# Patient Record
Sex: FEMALE | Race: WHITE | NOT HISPANIC OR LATINO | Employment: OTHER | URBAN - METROPOLITAN AREA
[De-identification: names, ages, dates, MRNs, and addresses within clinical notes are randomized per-mention and may not be internally consistent; named-entity substitution may affect disease eponyms.]

---

## 2023-03-13 DIAGNOSIS — E11.9 DIABETES MELLITUS WITHOUT COMPLICATION (MULTI): ICD-10-CM

## 2023-03-13 PROBLEM — M25.551 RIGHT HIP PAIN: Status: ACTIVE | Noted: 2023-03-13

## 2023-03-13 PROBLEM — D64.9 ANEMIA: Status: ACTIVE | Noted: 2023-03-13

## 2023-03-13 PROBLEM — M54.31 SCIATICA OF RIGHT SIDE: Status: ACTIVE | Noted: 2023-03-13

## 2023-03-13 PROBLEM — E66.812 CLASS 2 SEVERE OBESITY WITH BODY MASS INDEX (BMI) OF 35 TO 39.9 WITH SERIOUS COMORBIDITY: Status: ACTIVE | Noted: 2023-03-13

## 2023-03-13 PROBLEM — E78.5 HLD (HYPERLIPIDEMIA): Status: ACTIVE | Noted: 2023-03-13

## 2023-03-13 PROBLEM — M10.9 ACUTE GOUT: Status: ACTIVE | Noted: 2023-03-13

## 2023-03-13 PROBLEM — N18.4 STAGE 4 CHRONIC KIDNEY DISEASE (MULTI): Status: ACTIVE | Noted: 2023-03-13

## 2023-03-13 PROBLEM — N18.31 STAGE 3A CHRONIC KIDNEY DISEASE (MULTI): Status: ACTIVE | Noted: 2023-03-13

## 2023-03-13 PROBLEM — M70.61 TROCHANTERIC BURSITIS OF RIGHT HIP: Status: ACTIVE | Noted: 2023-03-13

## 2023-03-13 PROBLEM — M47.812 NECK ARTHRITIS: Status: ACTIVE | Noted: 2023-03-13

## 2023-03-13 PROBLEM — M25.561 KNEE PAIN, RIGHT: Status: ACTIVE | Noted: 2023-03-13

## 2023-03-13 PROBLEM — I10 HTN (HYPERTENSION): Status: ACTIVE | Noted: 2023-03-13

## 2023-03-13 PROBLEM — R11.2 NAUSEA AND/OR VOMITING: Status: ACTIVE | Noted: 2023-03-13

## 2023-03-13 PROBLEM — M1A.00X0 CHRONIC IDIOPATHIC GOUT: Status: ACTIVE | Noted: 2023-03-13

## 2023-03-13 PROBLEM — G47.00 INSOMNIA: Status: ACTIVE | Noted: 2023-03-13

## 2023-03-13 PROBLEM — N39.0 UTI (URINARY TRACT INFECTION): Status: ACTIVE | Noted: 2023-03-13

## 2023-03-13 PROBLEM — F41.9 ANXIETY: Status: ACTIVE | Noted: 2023-03-13

## 2023-03-13 PROBLEM — K21.9 CHRONIC GASTROESOPHAGEAL REFLUX DISEASE: Status: ACTIVE | Noted: 2023-03-13

## 2023-03-13 PROBLEM — M15.9 GENERALIZED OSTEOARTHRITIS OF MULTIPLE SITES: Status: ACTIVE | Noted: 2023-03-13

## 2023-03-13 PROBLEM — M54.16 RIGHT LUMBAR RADICULOPATHY: Status: ACTIVE | Noted: 2023-03-13

## 2023-03-13 PROBLEM — R53.1 WEAKNESS: Status: ACTIVE | Noted: 2023-03-13

## 2023-03-13 PROBLEM — E66.01 CLASS 2 SEVERE OBESITY WITH BODY MASS INDEX (BMI) OF 35 TO 39.9 WITH SERIOUS COMORBIDITY (MULTI): Status: ACTIVE | Noted: 2023-03-13

## 2023-03-13 RX ORDER — METOPROLOL SUCCINATE 100 MG/1
1 TABLET, EXTENDED RELEASE ORAL DAILY
COMMUNITY
Start: 2022-06-20 | End: 2023-06-05 | Stop reason: ALTCHOICE

## 2023-03-13 RX ORDER — LOSARTAN POTASSIUM 100 MG/1
1 TABLET ORAL DAILY
COMMUNITY
Start: 2016-05-26 | End: 2024-04-05

## 2023-03-13 RX ORDER — ALLOPURINOL 100 MG/1
TABLET ORAL
COMMUNITY
Start: 2021-05-05 | End: 2024-01-02

## 2023-03-13 RX ORDER — PANTOPRAZOLE SODIUM 40 MG/1
1 TABLET, DELAYED RELEASE ORAL DAILY
COMMUNITY
Start: 2015-01-26 | End: 2023-06-01 | Stop reason: SDUPTHER

## 2023-03-13 RX ORDER — DULAGLUTIDE 1.5 MG/.5ML
INJECTION, SOLUTION SUBCUTANEOUS
COMMUNITY
Start: 2023-01-23 | End: 2023-06-05 | Stop reason: ALTCHOICE

## 2023-03-13 RX ORDER — DAPAGLIFLOZIN 5 MG/1
10 TABLET, FILM COATED ORAL
COMMUNITY
Start: 2023-02-23 | End: 2024-05-29 | Stop reason: SDUPTHER

## 2023-03-13 RX ORDER — INSULIN DETEMIR 100 [IU]/ML
INJECTION, SOLUTION SUBCUTANEOUS
COMMUNITY
Start: 2018-11-13 | End: 2023-03-13 | Stop reason: SDUPTHER

## 2023-03-13 RX ORDER — SIMVASTATIN 20 MG/1
1 TABLET, FILM COATED ORAL DAILY
COMMUNITY
Start: 2015-01-26 | End: 2023-03-20

## 2023-03-13 RX ORDER — CARVEDILOL 12.5 MG/1
1 TABLET ORAL 2 TIMES DAILY
COMMUNITY
Start: 2022-03-07 | End: 2023-06-14 | Stop reason: SDUPTHER

## 2023-03-13 RX ORDER — ERGOCALCIFEROL 1.25 MG/1
1 CAPSULE ORAL
COMMUNITY
Start: 2023-01-25 | End: 2023-10-23

## 2023-03-13 RX ORDER — METFORMIN HYDROCHLORIDE 1000 MG/1
TABLET ORAL
COMMUNITY
Start: 2022-08-17 | End: 2023-06-05 | Stop reason: ALTCHOICE

## 2023-03-13 RX ORDER — AMITRIPTYLINE HYDROCHLORIDE 25 MG/1
TABLET, FILM COATED ORAL
COMMUNITY
Start: 2014-12-24 | End: 2024-02-23 | Stop reason: ALTCHOICE

## 2023-03-13 RX ORDER — PEN NEEDLE, DIABETIC 32GX 5/32"
NEEDLE, DISPOSABLE MISCELLANEOUS
COMMUNITY
Start: 2023-03-06 | End: 2023-08-14 | Stop reason: SDUPTHER

## 2023-03-13 RX ORDER — GLIMEPIRIDE 4 MG/1
1 TABLET ORAL 2 TIMES DAILY
COMMUNITY
Start: 2015-03-07 | End: 2023-06-05 | Stop reason: ALTCHOICE

## 2023-03-13 RX ORDER — BLOOD SUGAR DIAGNOSTIC
STRIP MISCELLANEOUS
COMMUNITY
Start: 2022-07-26

## 2023-03-13 RX ORDER — AMLODIPINE BESYLATE 5 MG/1
1 TABLET ORAL DAILY
COMMUNITY
Start: 2021-05-05 | End: 2023-06-13 | Stop reason: SDUPTHER

## 2023-03-13 RX ORDER — LANCETS 33 GAUGE
EACH MISCELLANEOUS
COMMUNITY
Start: 2023-02-07

## 2023-03-13 RX ORDER — GABAPENTIN 100 MG/1
CAPSULE ORAL
COMMUNITY
Start: 2022-09-14 | End: 2023-06-05 | Stop reason: ALTCHOICE

## 2023-03-13 RX ORDER — CYCLOBENZAPRINE HCL 10 MG
1 TABLET ORAL
COMMUNITY
Start: 2022-09-14 | End: 2023-06-05 | Stop reason: ALTCHOICE

## 2023-03-13 RX ORDER — HYDROCHLOROTHIAZIDE 25 MG/1
1 TABLET ORAL DAILY
COMMUNITY
Start: 2014-10-12 | End: 2024-03-08 | Stop reason: ALTCHOICE

## 2023-03-13 RX ORDER — MEDICAL SUPPLY, MISCELLANEOUS
EACH MISCELLANEOUS
COMMUNITY
Start: 2023-02-07

## 2023-03-14 RX ORDER — INSULIN DETEMIR 100 [IU]/ML
70 INJECTION, SOLUTION SUBCUTANEOUS 2 TIMES DAILY
Qty: 9 EACH | Refills: 1 | Status: SHIPPED | OUTPATIENT
Start: 2023-03-14 | End: 2023-07-13 | Stop reason: SDUPTHER

## 2023-04-26 LAB
ALBUMIN (G/DL) IN SER/PLAS: 4.4 G/DL (ref 3.4–5)
ALBUMIN (MG/L) IN URINE: 13.7 MG/L
ALBUMIN/CREATININE (UG/MG) IN URINE: 18.2 UG/MG CRT (ref 0–30)
ANION GAP IN SER/PLAS: 15 MMOL/L (ref 10–20)
CALCIDIOL (25 OH VITAMIN D3) (NG/ML) IN SER/PLAS: 37 NG/ML
CALCIUM (MG/DL) IN SER/PLAS: 9.5 MG/DL (ref 8.6–10.3)
CARBON DIOXIDE, TOTAL (MMOL/L) IN SER/PLAS: 24 MMOL/L (ref 21–32)
CHLORIDE (MMOL/L) IN SER/PLAS: 100 MMOL/L (ref 98–107)
CREATININE (MG/DL) IN SER/PLAS: 1.8 MG/DL (ref 0.5–1.05)
CREATININE (MG/DL) IN URINE: 75.3 MG/DL (ref 20–320)
ERYTHROCYTE DISTRIBUTION WIDTH (RATIO) BY AUTOMATED COUNT: 14.8 % (ref 11.5–14.5)
ERYTHROCYTE MEAN CORPUSCULAR HEMOGLOBIN CONCENTRATION (G/DL) BY AUTOMATED: 31 G/DL (ref 32–36)
ERYTHROCYTE MEAN CORPUSCULAR VOLUME (FL) BY AUTOMATED COUNT: 97 FL (ref 80–100)
ERYTHROCYTES (10*6/UL) IN BLOOD BY AUTOMATED COUNT: 4.24 X10E12/L (ref 4–5.2)
FERRITIN (UG/LL) IN SER/PLAS: 727 UG/L (ref 8–150)
GFR FEMALE: 28 ML/MIN/1.73M2
GLUCOSE (MG/DL) IN SER/PLAS: 328 MG/DL (ref 74–99)
HEMATOCRIT (%) IN BLOOD BY AUTOMATED COUNT: 41.3 % (ref 36–46)
HEMOGLOBIN (G/DL) IN BLOOD: 12.8 G/DL (ref 12–16)
IRON (UG/DL) IN SER/PLAS: 68 UG/DL (ref 35–150)
IRON BINDING CAPACITY (UG/DL) IN SER/PLAS: 230 UG/DL (ref 240–445)
IRON SATURATION (%) IN SER/PLAS: 30 % (ref 25–45)
LEUKOCYTES (10*3/UL) IN BLOOD BY AUTOMATED COUNT: 9.5 X10E9/L (ref 4.4–11.3)
PHOSPHATE (MG/DL) IN SER/PLAS: 4.1 MG/DL (ref 2.5–4.9)
PLATELETS (10*3/UL) IN BLOOD AUTOMATED COUNT: 245 X10E9/L (ref 150–450)
POTASSIUM (MMOL/L) IN SER/PLAS: 5.3 MMOL/L (ref 3.5–5.3)
SODIUM (MMOL/L) IN SER/PLAS: 134 MMOL/L (ref 136–145)
URATE (MG/DL) IN SER/PLAS: 4.7 MG/DL (ref 2.3–6.7)
UREA NITROGEN (MG/DL) IN SER/PLAS: 32 MG/DL (ref 6–23)

## 2023-04-27 LAB — PARATHYRIN INTACT (PG/ML) IN SER/PLAS: 92.1 PG/ML (ref 18.5–88)

## 2023-05-25 PROBLEM — E55.9 VITAMIN D DEFICIENCY: Status: ACTIVE | Noted: 2023-05-25

## 2023-05-25 PROBLEM — E61.1 IRON DEFICIENCY: Status: ACTIVE | Noted: 2023-05-25

## 2023-06-01 DIAGNOSIS — K21.9 CHRONIC GASTROESOPHAGEAL REFLUX DISEASE: ICD-10-CM

## 2023-06-01 NOTE — TELEPHONE ENCOUNTER
Requested Prescriptions     Pending Prescriptions Disp Refills    pantoprazole (ProtoNix) 40 mg EC tablet 90 tablet 0     Sig: Take 1 tablet (40 mg) by mouth once daily.

## 2023-06-02 RX ORDER — PANTOPRAZOLE SODIUM 40 MG/1
40 TABLET, DELAYED RELEASE ORAL DAILY
Qty: 90 TABLET | Refills: 0 | Status: SHIPPED | OUTPATIENT
Start: 2023-06-02 | End: 2023-08-21

## 2023-06-05 ENCOUNTER — LAB (OUTPATIENT)
Dept: LAB | Facility: LAB | Age: 78
End: 2023-06-05
Payer: MEDICARE

## 2023-06-05 ENCOUNTER — OFFICE VISIT (OUTPATIENT)
Dept: PRIMARY CARE | Facility: CLINIC | Age: 78
End: 2023-06-05
Payer: MEDICARE

## 2023-06-05 VITALS
OXYGEN SATURATION: 98 % | SYSTOLIC BLOOD PRESSURE: 129 MMHG | DIASTOLIC BLOOD PRESSURE: 69 MMHG | WEIGHT: 188 LBS | BODY MASS INDEX: 35.82 KG/M2 | HEART RATE: 77 BPM

## 2023-06-05 DIAGNOSIS — Z79.4 TYPE 2 DIABETES MELLITUS WITH STAGE 4 CHRONIC KIDNEY DISEASE, WITH LONG-TERM CURRENT USE OF INSULIN (MULTI): ICD-10-CM

## 2023-06-05 DIAGNOSIS — E11.22 TYPE 2 DIABETES MELLITUS WITH STAGE 4 CHRONIC KIDNEY DISEASE, WITH LONG-TERM CURRENT USE OF INSULIN (MULTI): ICD-10-CM

## 2023-06-05 DIAGNOSIS — N94.9 VAGINAL BURNING: Primary | ICD-10-CM

## 2023-06-05 DIAGNOSIS — N18.4 TYPE 2 DIABETES MELLITUS WITH STAGE 4 CHRONIC KIDNEY DISEASE, WITH LONG-TERM CURRENT USE OF INSULIN (MULTI): ICD-10-CM

## 2023-06-05 DIAGNOSIS — I10 PRIMARY HYPERTENSION: ICD-10-CM

## 2023-06-05 PROCEDURE — 83036 HEMOGLOBIN GLYCOSYLATED A1C: CPT

## 2023-06-05 PROCEDURE — 99214 OFFICE O/P EST MOD 30 MIN: CPT | Performed by: INTERNAL MEDICINE

## 2023-06-05 PROCEDURE — 1036F TOBACCO NON-USER: CPT | Performed by: INTERNAL MEDICINE

## 2023-06-05 PROCEDURE — 1160F RVW MEDS BY RX/DR IN RCRD: CPT | Performed by: INTERNAL MEDICINE

## 2023-06-05 PROCEDURE — 3074F SYST BP LT 130 MM HG: CPT | Performed by: INTERNAL MEDICINE

## 2023-06-05 PROCEDURE — 3078F DIAST BP <80 MM HG: CPT | Performed by: INTERNAL MEDICINE

## 2023-06-05 PROCEDURE — 36415 COLL VENOUS BLD VENIPUNCTURE: CPT

## 2023-06-05 PROCEDURE — 1159F MED LIST DOCD IN RCRD: CPT | Performed by: INTERNAL MEDICINE

## 2023-06-05 NOTE — PATIENT INSTRUCTIONS
STEFANO Barros Gyn: [phone]    Stop glimepiride and increase levemir to 80 units twice daily , go get A1c checked     Come back in 3 months

## 2023-06-05 NOTE — PROGRESS NOTES
Subjective   Patient ID: Kathya Ruggiero is a 78 y.o. female who presents for Follow-up.    HPI   Reports burning and itching around her vagina. He denies dysuria, urgency or frequency. No vaginal discharge.     Review of Systems   All other systems reviewed and are negative.      Objective   /69   Pulse 77   Wt 85.3 kg (188 lb)   SpO2 98%   BMI 35.82 kg/m²     Physical Exam  Constitutional:       Appearance: Normal appearance.   HENT:      Head: Normocephalic and atraumatic.   Cardiovascular:      Rate and Rhythm: Normal rate and regular rhythm.      Heart sounds: Normal heart sounds. No murmur heard.     No gallop.   Pulmonary:      Effort: Pulmonary effort is normal. No respiratory distress.      Breath sounds: No wheezing or rales.   Skin:     General: Skin is warm and dry.      Findings: No rash.   Neurological:      Mental Status: She is alert and oriented to person, place, and time. Mental status is at baseline.   Psychiatric:         Mood and Affect: Mood normal.         Behavior: Behavior normal.         Assessment/Plan       #IDDM2  -A1C 8.5%. --order today   -Stop glimepiride due to advanced CKD  -Cont Farxiga 5mg daily  -Increase  Levemir 80 Units BID      #HTN  -Well controlled.  Cont losartan 100mg daily and carvedilol 12.5mg BID      #HLD  -Cont statin     #Insomia  -Continue amitriptyline to 50mg nightly      #CKD4  -Following with Dr. Hunter.      #Lumbar radiculopathy, right knee OA  -Cont gabapentin and P.T.      #Gout   -Cont allopurinol         Influenza: UTD   COVID: x3   Prevnar 13: UTD   Pneumovax 23: UTD   Shingrix: x2  Mamm: 6/29/21  DEXA: 6/10/2020  Pap: NI   Colorectal ca: 9/12/13  Lung ca screening: NI      RTC 3 mo

## 2023-06-06 LAB
ESTIMATED AVERAGE GLUCOSE FOR HBA1C: 243 MG/DL
HEMOGLOBIN A1C/HEMOGLOBIN TOTAL IN BLOOD: 10.1 %

## 2023-06-08 DIAGNOSIS — Z79.4 TYPE 2 DIABETES MELLITUS WITH STAGE 4 CHRONIC KIDNEY DISEASE, WITH LONG-TERM CURRENT USE OF INSULIN (MULTI): Primary | ICD-10-CM

## 2023-06-08 DIAGNOSIS — N18.4 TYPE 2 DIABETES MELLITUS WITH STAGE 4 CHRONIC KIDNEY DISEASE, WITH LONG-TERM CURRENT USE OF INSULIN (MULTI): Primary | ICD-10-CM

## 2023-06-08 DIAGNOSIS — E11.22 TYPE 2 DIABETES MELLITUS WITH STAGE 4 CHRONIC KIDNEY DISEASE, WITH LONG-TERM CURRENT USE OF INSULIN (MULTI): Primary | ICD-10-CM

## 2023-06-08 RX ORDER — INSULIN LISPRO 100 [IU]/ML
4 INJECTION, SOLUTION INTRAVENOUS; SUBCUTANEOUS
Qty: 1 EACH | Refills: 0 | Status: SHIPPED | OUTPATIENT
Start: 2023-06-08 | End: 2023-06-09

## 2023-06-09 DIAGNOSIS — E11.22 TYPE 2 DIABETES MELLITUS WITH STAGE 4 CHRONIC KIDNEY DISEASE, WITH LONG-TERM CURRENT USE OF INSULIN (MULTI): ICD-10-CM

## 2023-06-09 DIAGNOSIS — N18.4 TYPE 2 DIABETES MELLITUS WITH STAGE 4 CHRONIC KIDNEY DISEASE, WITH LONG-TERM CURRENT USE OF INSULIN (MULTI): ICD-10-CM

## 2023-06-09 DIAGNOSIS — Z79.4 TYPE 2 DIABETES MELLITUS WITH STAGE 4 CHRONIC KIDNEY DISEASE, WITH LONG-TERM CURRENT USE OF INSULIN (MULTI): ICD-10-CM

## 2023-06-10 RX ORDER — INSULIN ASPART 100 [IU]/ML
INJECTION, SOLUTION INTRAVENOUS; SUBCUTANEOUS
Qty: 10 ML | Refills: 12 | Status: SHIPPED | OUTPATIENT
Start: 2023-06-10 | End: 2023-07-13 | Stop reason: SDUPTHER

## 2023-06-13 DIAGNOSIS — I10 HYPERTENSION, UNSPECIFIED TYPE: ICD-10-CM

## 2023-06-14 RX ORDER — AMLODIPINE BESYLATE 5 MG/1
5 TABLET ORAL DAILY
Qty: 90 TABLET | Refills: 1 | Status: SHIPPED | OUTPATIENT
Start: 2023-06-14 | End: 2024-02-23 | Stop reason: ALTCHOICE

## 2023-06-14 RX ORDER — CARVEDILOL 12.5 MG/1
12.5 TABLET ORAL 2 TIMES DAILY
Qty: 90 TABLET | Refills: 1 | Status: SHIPPED | OUTPATIENT
Start: 2023-06-14 | End: 2023-07-21

## 2023-06-15 ENCOUNTER — OFFICE VISIT (OUTPATIENT)
Dept: PRIMARY CARE | Facility: CLINIC | Age: 78
End: 2023-06-15
Payer: MEDICARE

## 2023-06-15 VITALS
DIASTOLIC BLOOD PRESSURE: 70 MMHG | TEMPERATURE: 98.9 F | SYSTOLIC BLOOD PRESSURE: 126 MMHG | WEIGHT: 189 LBS | BODY MASS INDEX: 36.01 KG/M2 | HEART RATE: 91 BPM

## 2023-06-15 DIAGNOSIS — H66.90 ACUTE OTITIS MEDIA, UNSPECIFIED OTITIS MEDIA TYPE: Primary | ICD-10-CM

## 2023-06-15 PROCEDURE — 1036F TOBACCO NON-USER: CPT | Performed by: INTERNAL MEDICINE

## 2023-06-15 PROCEDURE — 1160F RVW MEDS BY RX/DR IN RCRD: CPT | Performed by: INTERNAL MEDICINE

## 2023-06-15 PROCEDURE — 99213 OFFICE O/P EST LOW 20 MIN: CPT | Performed by: INTERNAL MEDICINE

## 2023-06-15 PROCEDURE — 1159F MED LIST DOCD IN RCRD: CPT | Performed by: INTERNAL MEDICINE

## 2023-06-15 PROCEDURE — 3074F SYST BP LT 130 MM HG: CPT | Performed by: INTERNAL MEDICINE

## 2023-06-15 PROCEDURE — 3078F DIAST BP <80 MM HG: CPT | Performed by: INTERNAL MEDICINE

## 2023-06-15 RX ORDER — AMOXICILLIN 875 MG/1
875 TABLET, FILM COATED ORAL 2 TIMES DAILY
Qty: 14 TABLET | Refills: 0 | Status: SHIPPED | OUTPATIENT
Start: 2023-06-15 | End: 2023-06-22

## 2023-06-15 ASSESSMENT — PATIENT HEALTH QUESTIONNAIRE - PHQ9
SUM OF ALL RESPONSES TO PHQ9 QUESTIONS 1 AND 2: 0
2. FEELING DOWN, DEPRESSED OR HOPELESS: NOT AT ALL
1. LITTLE INTEREST OR PLEASURE IN DOING THINGS: NOT AT ALL

## 2023-06-15 ASSESSMENT — PAIN SCALES - GENERAL: PAINLEVEL: 10-WORST PAIN EVER

## 2023-06-15 ASSESSMENT — ENCOUNTER SYMPTOMS
DEPRESSION: 0
LOSS OF SENSATION IN FEET: 0
OCCASIONAL FEELINGS OF UNSTEADINESS: 0

## 2023-06-15 NOTE — PROGRESS NOTES
Subjective   Patient ID: Kathya Ruggiero is a 78 y.o. female who presents for Earache (Left ear).    Earache          Left ear pain x 2 days  No drainage   No new new hearing   Has chronic tinnitus   No sore throat, cough, fever or chills    Review of Systems   HENT:  Positive for ear pain.    All other systems reviewed and are negative.      Objective   /70 (BP Location: Left arm, Patient Position: Sitting, BP Cuff Size: Adult)   Pulse 91   Temp 37.2 °C (98.9 °F) (Oral)   Wt 85.7 kg (189 lb)   BMI 36.01 kg/m²     Physical Exam  Constitutional:       Appearance: Normal appearance.   HENT:      Head: Normocephalic and atraumatic.      Right Ear: Tympanic membrane, ear canal and external ear normal.      Left Ear: Ear canal and external ear normal.      Ears:      Comments: Left TM erythematous   Cardiovascular:      Rate and Rhythm: Normal rate and regular rhythm.      Heart sounds: Normal heart sounds. No murmur heard.     No gallop.   Pulmonary:      Effort: Pulmonary effort is normal. No respiratory distress.      Breath sounds: No wheezing or rales.   Skin:     General: Skin is warm and dry.      Findings: No rash.   Neurological:      Mental Status: She is alert and oriented to person, place, and time. Mental status is at baseline.   Psychiatric:         Mood and Affect: Mood normal.         Behavior: Behavior normal.         Assessment/Plan          
No

## 2023-06-15 NOTE — PATIENT INSTRUCTIONS
Increase levemir to 80 units twice daily   Premeal prior to breakfast and dinner -- 6 units    Take amoxicillin

## 2023-07-05 ENCOUNTER — APPOINTMENT (OUTPATIENT)
Dept: PRIMARY CARE | Facility: CLINIC | Age: 78
End: 2023-07-05
Payer: MEDICARE

## 2023-07-13 ENCOUNTER — OFFICE VISIT (OUTPATIENT)
Dept: PRIMARY CARE | Facility: CLINIC | Age: 78
End: 2023-07-13
Payer: MEDICARE

## 2023-07-13 VITALS
SYSTOLIC BLOOD PRESSURE: 117 MMHG | HEART RATE: 60 BPM | WEIGHT: 187 LBS | HEIGHT: 61 IN | BODY MASS INDEX: 35.3 KG/M2 | DIASTOLIC BLOOD PRESSURE: 64 MMHG

## 2023-07-13 DIAGNOSIS — Z79.4 TYPE 2 DIABETES MELLITUS WITH STAGE 4 CHRONIC KIDNEY DISEASE, WITH LONG-TERM CURRENT USE OF INSULIN (MULTI): ICD-10-CM

## 2023-07-13 DIAGNOSIS — N18.4 TYPE 2 DIABETES MELLITUS WITH STAGE 4 CHRONIC KIDNEY DISEASE, WITH LONG-TERM CURRENT USE OF INSULIN (MULTI): ICD-10-CM

## 2023-07-13 DIAGNOSIS — E11.22 TYPE 2 DIABETES MELLITUS WITH STAGE 4 CHRONIC KIDNEY DISEASE, WITH LONG-TERM CURRENT USE OF INSULIN (MULTI): ICD-10-CM

## 2023-07-13 DIAGNOSIS — E11.9 DIABETES MELLITUS WITHOUT COMPLICATION (MULTI): ICD-10-CM

## 2023-07-13 PROCEDURE — 1036F TOBACCO NON-USER: CPT | Performed by: INTERNAL MEDICINE

## 2023-07-13 PROCEDURE — 99213 OFFICE O/P EST LOW 20 MIN: CPT | Performed by: INTERNAL MEDICINE

## 2023-07-13 PROCEDURE — 1159F MED LIST DOCD IN RCRD: CPT | Performed by: INTERNAL MEDICINE

## 2023-07-13 PROCEDURE — 1126F AMNT PAIN NOTED NONE PRSNT: CPT | Performed by: INTERNAL MEDICINE

## 2023-07-13 PROCEDURE — 1160F RVW MEDS BY RX/DR IN RCRD: CPT | Performed by: INTERNAL MEDICINE

## 2023-07-13 PROCEDURE — 3074F SYST BP LT 130 MM HG: CPT | Performed by: INTERNAL MEDICINE

## 2023-07-13 PROCEDURE — 3078F DIAST BP <80 MM HG: CPT | Performed by: INTERNAL MEDICINE

## 2023-07-13 RX ORDER — INSULIN DETEMIR 100 [IU]/ML
80 INJECTION, SOLUTION SUBCUTANEOUS 2 TIMES DAILY
Qty: 144 ML | Refills: 1 | Status: SHIPPED | OUTPATIENT
Start: 2023-07-13 | End: 2024-02-23 | Stop reason: ALTCHOICE

## 2023-07-13 RX ORDER — INSULIN ASPART 100 [IU]/ML
INJECTION, SOLUTION INTRAVENOUS; SUBCUTANEOUS
Qty: 18 ML | Refills: 12 | Status: SHIPPED | OUTPATIENT
Start: 2023-07-13 | End: 2024-03-08 | Stop reason: SDUPTHER

## 2023-07-13 ASSESSMENT — PAIN SCALES - GENERAL: PAINLEVEL: 0-NO PAIN

## 2023-07-13 NOTE — PATIENT INSTRUCTIONS
CHECK GLUCOSE IN AM ,BEDTIME AND 2 HOURS AFTER LARGEST MEAL OF THE DAY     GOAL AM SUGAR <150  POST MEAL  <180    COME BACK IN 3 MONTHS , GET A1C BEFORE YOU COME BACK

## 2023-07-13 NOTE — PROGRESS NOTES
"Subjective   Patient ID: Kathya Ruggiero is a 78 y.o. female who presents for Follow-up.    HPI   Sugars much improved. AM randing low 100s-mid 100s. No lows   She increased her Lispro to 10 units AC--usually only eats 2 meals daily     Review of Systems   All other systems reviewed and are negative.      Objective   /64 (BP Location: Left arm, Patient Position: Sitting, BP Cuff Size: Large adult)   Pulse 60   Ht 1.543 m (5' 0.75\")   Wt 84.8 kg (187 lb)   BMI 35.62 kg/m²     Physical Exam  Constitutional:       Appearance: Normal appearance.   HENT:      Head: Normocephalic and atraumatic.   Cardiovascular:      Rate and Rhythm: Normal rate and regular rhythm.      Heart sounds: Normal heart sounds. No murmur heard.     No gallop.   Pulmonary:      Effort: Pulmonary effort is normal. No respiratory distress.      Breath sounds: No wheezing or rales.   Skin:     General: Skin is warm and dry.      Findings: No rash.   Neurological:      Mental Status: She is alert and oriented to person, place, and time. Mental status is at baseline.   Psychiatric:         Mood and Affect: Mood normal.         Behavior: Behavior normal.         Assessment/Plan       #DM2  -Sugars much improved.  -Cont Levemir 80 units BID and Novolog 10U AC   -Cont Farxiga 5mg daily     RTC 3 mo with A1c prior        "

## 2023-07-20 DIAGNOSIS — I10 HYPERTENSION, UNSPECIFIED TYPE: ICD-10-CM

## 2023-07-21 RX ORDER — CARVEDILOL 12.5 MG/1
12.5 TABLET ORAL 2 TIMES DAILY
Qty: 180 TABLET | Refills: 0 | Status: SHIPPED | OUTPATIENT
Start: 2023-07-21 | End: 2023-12-08

## 2023-07-28 DIAGNOSIS — G47.00 INSOMNIA, UNSPECIFIED TYPE: Primary | ICD-10-CM

## 2023-07-31 RX ORDER — AMITRIPTYLINE HYDROCHLORIDE 50 MG/1
50 TABLET, FILM COATED ORAL NIGHTLY
Qty: 90 TABLET | Refills: 1 | Status: SHIPPED | OUTPATIENT
Start: 2023-07-31 | End: 2024-01-29 | Stop reason: SDUPTHER

## 2023-08-09 RX ORDER — PEN NEEDLE, DIABETIC 32GX 5/32"
NEEDLE, DISPOSABLE MISCELLANEOUS
Status: CANCELLED | OUTPATIENT
Start: 2023-08-09

## 2023-08-14 DIAGNOSIS — Z79.4 TYPE 2 DIABETES MELLITUS WITH STAGE 4 CHRONIC KIDNEY DISEASE, WITH LONG-TERM CURRENT USE OF INSULIN (MULTI): Primary | ICD-10-CM

## 2023-08-14 DIAGNOSIS — E11.22 TYPE 2 DIABETES MELLITUS WITH STAGE 4 CHRONIC KIDNEY DISEASE, WITH LONG-TERM CURRENT USE OF INSULIN (MULTI): Primary | ICD-10-CM

## 2023-08-14 DIAGNOSIS — N18.4 TYPE 2 DIABETES MELLITUS WITH STAGE 4 CHRONIC KIDNEY DISEASE, WITH LONG-TERM CURRENT USE OF INSULIN (MULTI): Primary | ICD-10-CM

## 2023-08-14 RX ORDER — PEN NEEDLE, DIABETIC 32GX 5/32"
2 NEEDLE, DISPOSABLE MISCELLANEOUS 2 TIMES DAILY
Qty: 200 EACH | Refills: 1 | Status: SHIPPED | OUTPATIENT
Start: 2023-08-14 | End: 2023-11-20

## 2023-08-14 NOTE — TELEPHONE ENCOUNTER
"Requested Prescriptions     Pending Prescriptions Disp Refills    pen needle, diabetic 31 gauge x 15/64\" needle 200 each 1     Si each by abdominal subcutaneous route 2 times a day.       "

## 2023-08-21 DIAGNOSIS — K21.9 CHRONIC GASTROESOPHAGEAL REFLUX DISEASE: ICD-10-CM

## 2023-08-21 RX ORDER — PANTOPRAZOLE SODIUM 40 MG/1
40 TABLET, DELAYED RELEASE ORAL DAILY
Qty: 90 TABLET | Refills: 2 | Status: SHIPPED | OUTPATIENT
Start: 2023-08-21

## 2023-08-21 NOTE — TELEPHONE ENCOUNTER
Requested Prescriptions     Pending Prescriptions Disp Refills    pantoprazole (ProtoNix) 40 mg EC tablet [Pharmacy Med Name: Pantoprazole Sodium 40 MG Oral Tablet Delayed Release] 90 tablet 2     Sig: Take 1 tablet by mouth once daily

## 2023-09-06 ENCOUNTER — APPOINTMENT (OUTPATIENT)
Dept: PRIMARY CARE | Facility: CLINIC | Age: 78
End: 2023-09-06
Payer: MEDICARE

## 2023-10-11 ENCOUNTER — LAB (OUTPATIENT)
Dept: LAB | Facility: LAB | Age: 78
End: 2023-10-11
Payer: MEDICARE

## 2023-10-11 DIAGNOSIS — E11.22 TYPE 2 DIABETES MELLITUS WITH STAGE 4 CHRONIC KIDNEY DISEASE, WITH LONG-TERM CURRENT USE OF INSULIN (MULTI): ICD-10-CM

## 2023-10-11 DIAGNOSIS — Z79.4 TYPE 2 DIABETES MELLITUS WITH STAGE 4 CHRONIC KIDNEY DISEASE, WITH LONG-TERM CURRENT USE OF INSULIN (MULTI): ICD-10-CM

## 2023-10-11 DIAGNOSIS — N18.4 TYPE 2 DIABETES MELLITUS WITH STAGE 4 CHRONIC KIDNEY DISEASE, WITH LONG-TERM CURRENT USE OF INSULIN (MULTI): ICD-10-CM

## 2023-10-11 PROCEDURE — 36415 COLL VENOUS BLD VENIPUNCTURE: CPT

## 2023-10-11 PROCEDURE — 83036 HEMOGLOBIN GLYCOSYLATED A1C: CPT

## 2023-10-12 LAB
EST. AVERAGE GLUCOSE BLD GHB EST-MCNC: 194 MG/DL
HBA1C MFR BLD: 8.4 %

## 2023-10-16 ENCOUNTER — OFFICE VISIT (OUTPATIENT)
Dept: PRIMARY CARE | Facility: CLINIC | Age: 78
End: 2023-10-16
Payer: MEDICARE

## 2023-10-16 VITALS
SYSTOLIC BLOOD PRESSURE: 149 MMHG | OXYGEN SATURATION: 92 % | DIASTOLIC BLOOD PRESSURE: 73 MMHG | BODY MASS INDEX: 35.43 KG/M2 | WEIGHT: 186 LBS | HEART RATE: 69 BPM

## 2023-10-16 DIAGNOSIS — Z23 NEED FOR INFLUENZA VACCINATION: ICD-10-CM

## 2023-10-16 DIAGNOSIS — E11.9 DIABETES MELLITUS WITHOUT COMPLICATION (MULTI): Primary | ICD-10-CM

## 2023-10-16 PROCEDURE — 1036F TOBACCO NON-USER: CPT | Performed by: INTERNAL MEDICINE

## 2023-10-16 PROCEDURE — G0008 ADMIN INFLUENZA VIRUS VAC: HCPCS | Performed by: INTERNAL MEDICINE

## 2023-10-16 PROCEDURE — 1126F AMNT PAIN NOTED NONE PRSNT: CPT | Performed by: INTERNAL MEDICINE

## 2023-10-16 PROCEDURE — 1160F RVW MEDS BY RX/DR IN RCRD: CPT | Performed by: INTERNAL MEDICINE

## 2023-10-16 PROCEDURE — 99213 OFFICE O/P EST LOW 20 MIN: CPT | Performed by: INTERNAL MEDICINE

## 2023-10-16 PROCEDURE — 3078F DIAST BP <80 MM HG: CPT | Performed by: INTERNAL MEDICINE

## 2023-10-16 PROCEDURE — 3077F SYST BP >= 140 MM HG: CPT | Performed by: INTERNAL MEDICINE

## 2023-10-16 PROCEDURE — 1159F MED LIST DOCD IN RCRD: CPT | Performed by: INTERNAL MEDICINE

## 2023-10-16 PROCEDURE — 90662 IIV NO PRSV INCREASED AG IM: CPT | Performed by: INTERNAL MEDICINE

## 2023-10-16 RX ORDER — FLASH GLUCOSE SENSOR
KIT MISCELLANEOUS
Qty: 1 EACH | Refills: 0 | Status: SHIPPED | OUTPATIENT
Start: 2023-10-16 | End: 2024-01-22 | Stop reason: SDUPTHER

## 2023-10-16 RX ORDER — FLASH GLUCOSE SCANNING READER
EACH MISCELLANEOUS
Qty: 1 EACH | Refills: 0 | Status: SHIPPED | OUTPATIENT
Start: 2023-10-16

## 2023-10-16 NOTE — PROGRESS NOTES
fluzSubjective   Patient ID: Kathya Ruggiero is a 78 y.o. female who presents for Follow-up (3 month follow up).    HPI     Review of Systems    Objective   There were no vitals taken for this visit.    Physical Exam    Assessment/Plan

## 2023-10-16 NOTE — PROGRESS NOTES
Subjective   Patient ID: Kathya Ruggiero is a 78 y.o. female who presents for Follow-up (3 month follow up).    HPI   Patient is doing well.  Home sugars higher in October, states she eats sweets when she gets nervous and has been anxious this month due to family issues. Would like arm glucose monitor.       Objective   /73   Pulse 69   Wt 84.4 kg (186 lb)   SpO2 92%   BMI 35.43 kg/m²     Physical Exam  Constitutional:       General: She is not in acute distress.     Appearance: Normal appearance.   Cardiovascular:      Rate and Rhythm: Normal rate and regular rhythm.   Pulmonary:      Effort: Pulmonary effort is normal.      Breath sounds: Normal breath sounds.   Neurological:      Mental Status: She is alert and oriented to person, place, and time.   Psychiatric:         Mood and Affect: Mood normal.         Behavior: Behavior normal.         Assessment/Plan     #DM2  -A1C 8.4%   -Cont Farxiga 5mg daily  -Cont Levemir 80 Units BID   -Increase Novolog from 15U AC to 20U AC  -Free Style glucose monitor ordered     #HTN  -High here, not checking BP at home. . Cont losartan 100mg daily and carvedilol 12.5mg BID   -Instructed to monitor BP at home and call me if >140/90     #HLD  -Cont simvastatin 20mg     #Insomia  -Continue amitriptyline 50mg nightly      #CKD4  -Following with Dr. Hunter. Labs will be ordered at next month's appt.     #Gout   -Cont allopurinol 100mg        Influenza: 10/16/23   COVID: x3   Prevnar 13: UTD   Pneumovax 23: UTD   Shingrix: x2  Mamm: 6/29/21  DEXA: 6/10/2020  Pap: NI   Colorectal ca: 9/12/13  Lung ca screening: NI      Labs discussed with patient.   RTC 3 mo     Medical student note. Physician co-sign required.

## 2023-10-16 NOTE — PATIENT INSTRUCTIONS
Increase premeal insulin to 20 units, cont other meds as is   Freestyle ordered  Check bp at home , goal <140/90  Come back in 3 months

## 2023-10-23 DIAGNOSIS — E55.9 VITAMIN D DEFICIENCY: Primary | ICD-10-CM

## 2023-10-23 RX ORDER — ERGOCALCIFEROL 1.25 MG/1
1 CAPSULE ORAL
Qty: 13 CAPSULE | Refills: 0 | Status: SHIPPED | OUTPATIENT
Start: 2023-10-23 | End: 2024-01-16

## 2023-11-08 ENCOUNTER — APPOINTMENT (OUTPATIENT)
Dept: NEPHROLOGY | Facility: CLINIC | Age: 78
End: 2023-11-08
Payer: MEDICARE

## 2023-11-17 ENCOUNTER — LAB (OUTPATIENT)
Dept: LAB | Facility: LAB | Age: 78
End: 2023-11-17
Payer: MEDICARE

## 2023-11-17 DIAGNOSIS — N18.4 CHRONIC KIDNEY DISEASE, STAGE 4 (SEVERE) (MULTI): ICD-10-CM

## 2023-11-17 DIAGNOSIS — E55.9 VITAMIN D DEFICIENCY, UNSPECIFIED: ICD-10-CM

## 2023-11-17 DIAGNOSIS — E11.9 TYPE 2 DIABETES MELLITUS WITHOUT COMPLICATIONS (MULTI): ICD-10-CM

## 2023-11-17 DIAGNOSIS — I12.9 HYPERTENSIVE CHRONIC KIDNEY DISEASE WITH STAGE 1 THROUGH STAGE 4 CHRONIC KIDNEY DISEASE, OR UNSPECIFIED CHRONIC KIDNEY DISEASE: ICD-10-CM

## 2023-11-17 DIAGNOSIS — D64.9 ANEMIA, UNSPECIFIED: Primary | ICD-10-CM

## 2023-11-17 LAB
ALBUMIN SERPL BCP-MCNC: 4.6 G/DL (ref 3.4–5)
ANION GAP SERPL CALC-SCNC: 16 MMOL/L (ref 10–20)
BUN SERPL-MCNC: 31 MG/DL (ref 6–23)
CALCIUM SERPL-MCNC: 9.2 MG/DL (ref 8.6–10.3)
CHLORIDE SERPL-SCNC: 101 MMOL/L (ref 98–107)
CO2 SERPL-SCNC: 23 MMOL/L (ref 21–32)
CREAT SERPL-MCNC: 1.71 MG/DL (ref 0.5–1.05)
GFR SERPL CREATININE-BSD FRML MDRD: 30 ML/MIN/1.73M*2
GLUCOSE SERPL-MCNC: 187 MG/DL (ref 74–99)
PHOSPHATE SERPL-MCNC: 4.6 MG/DL (ref 2.5–4.9)
POTASSIUM SERPL-SCNC: 5.3 MMOL/L (ref 3.5–5.3)
SODIUM SERPL-SCNC: 135 MMOL/L (ref 136–145)
URATE SERPL-MCNC: 4.5 MG/DL (ref 2.3–6.7)

## 2023-11-17 PROCEDURE — 80069 RENAL FUNCTION PANEL: CPT

## 2023-11-17 PROCEDURE — 83970 ASSAY OF PARATHORMONE: CPT

## 2023-11-17 PROCEDURE — 36415 COLL VENOUS BLD VENIPUNCTURE: CPT

## 2023-11-17 PROCEDURE — 82306 VITAMIN D 25 HYDROXY: CPT

## 2023-11-17 PROCEDURE — 84550 ASSAY OF BLOOD/URIC ACID: CPT

## 2023-11-18 DIAGNOSIS — N18.4 TYPE 2 DIABETES MELLITUS WITH STAGE 4 CHRONIC KIDNEY DISEASE, WITH LONG-TERM CURRENT USE OF INSULIN (MULTI): ICD-10-CM

## 2023-11-18 DIAGNOSIS — E11.22 TYPE 2 DIABETES MELLITUS WITH STAGE 4 CHRONIC KIDNEY DISEASE, WITH LONG-TERM CURRENT USE OF INSULIN (MULTI): ICD-10-CM

## 2023-11-18 DIAGNOSIS — Z79.4 TYPE 2 DIABETES MELLITUS WITH STAGE 4 CHRONIC KIDNEY DISEASE, WITH LONG-TERM CURRENT USE OF INSULIN (MULTI): ICD-10-CM

## 2023-11-18 LAB
25(OH)D3 SERPL-MCNC: 73 NG/ML (ref 30–100)
PTH-INTACT SERPL-MCNC: 89.1 PG/ML (ref 18.5–88)

## 2023-11-20 RX ORDER — PEN NEEDLE, DIABETIC 32GX 5/32"
NEEDLE, DISPOSABLE MISCELLANEOUS
Qty: 200 EACH | Refills: 1 | Status: SHIPPED | OUTPATIENT
Start: 2023-11-20 | End: 2024-04-22

## 2023-11-20 NOTE — TELEPHONE ENCOUNTER
"Requested Prescriptions     Pending Prescriptions Disp Refills    pen needle, diabetic 31 gauge x 15/64\" needle [Pharmacy Med Name: RELION PEN NEEDLES [id number] MIS] 200 each 1     Sig: USE 2  TWICE DAILY       "

## 2023-11-21 ENCOUNTER — OFFICE VISIT (OUTPATIENT)
Dept: NEPHROLOGY | Facility: CLINIC | Age: 78
End: 2023-11-21
Payer: MEDICARE

## 2023-11-21 VITALS
HEIGHT: 60 IN | RESPIRATION RATE: 18 BRPM | DIASTOLIC BLOOD PRESSURE: 69 MMHG | OXYGEN SATURATION: 98 % | HEART RATE: 57 BPM | TEMPERATURE: 96.9 F | WEIGHT: 186.6 LBS | SYSTOLIC BLOOD PRESSURE: 162 MMHG | BODY MASS INDEX: 36.63 KG/M2

## 2023-11-21 DIAGNOSIS — E55.9 VITAMIN D DEFICIENCY: ICD-10-CM

## 2023-11-21 DIAGNOSIS — N18.32 STAGE 3B CHRONIC KIDNEY DISEASE (MULTI): Primary | ICD-10-CM

## 2023-11-21 DIAGNOSIS — E66.01 CLASS 2 SEVERE OBESITY WITH BODY MASS INDEX (BMI) OF 35 TO 39.9 WITH SERIOUS COMORBIDITY (MULTI): ICD-10-CM

## 2023-11-21 DIAGNOSIS — I10 PRIMARY HYPERTENSION: ICD-10-CM

## 2023-11-21 DIAGNOSIS — D50.8 OTHER IRON DEFICIENCY ANEMIA: ICD-10-CM

## 2023-11-21 DIAGNOSIS — M10.00 ACUTE IDIOPATHIC GOUT, UNSPECIFIED SITE: ICD-10-CM

## 2023-11-21 PROCEDURE — 1126F AMNT PAIN NOTED NONE PRSNT: CPT | Performed by: INTERNAL MEDICINE

## 2023-11-21 PROCEDURE — 3078F DIAST BP <80 MM HG: CPT | Performed by: INTERNAL MEDICINE

## 2023-11-21 PROCEDURE — 1160F RVW MEDS BY RX/DR IN RCRD: CPT | Performed by: INTERNAL MEDICINE

## 2023-11-21 PROCEDURE — 1036F TOBACCO NON-USER: CPT | Performed by: INTERNAL MEDICINE

## 2023-11-21 PROCEDURE — 3077F SYST BP >= 140 MM HG: CPT | Performed by: INTERNAL MEDICINE

## 2023-11-21 PROCEDURE — 1159F MED LIST DOCD IN RCRD: CPT | Performed by: INTERNAL MEDICINE

## 2023-11-21 PROCEDURE — 99214 OFFICE O/P EST MOD 30 MIN: CPT | Performed by: INTERNAL MEDICINE

## 2023-11-21 NOTE — PATIENT INSTRUCTIONS
Dear JUAN   It was nice seeing you in the nephrology clinic today     Today we discussed the following:     #Chronic kidney disease stage 3/4 most likely diabetes and hypertension effect. Your kidney function is now stable about 25-30% (this is new baseline now). Continue to hold water pills. I want you to continue losartan and Keep yourself well-hydrated     #Uric acid is elevated-now normal. Continue allopurinol     #Hypertension-blood pressure is accepted at home-continue losartan 100 mg. Do not take hydrochlorothiazide/water pill        #Diabetes-uncontrolled-continue to hold metformin. Continue Farxiga 10 mg- this class of medication might increase risk of urinary tract infection. Keep the genital area clean and dry. Don't hold urine (counseled)     #Anemia with low iron-improved significantly after IV iron infusion     #Low vitamin D-now normal. Continue vitamin D supplements weekly        Follow-up in 6 months with repeat blood work and urinalysis prior to next visit.     Please call our office if you have any question  Thank you for coming to see me today     Erasmo Hunter MD, MS, JELLY ALVARADO  Clinical  - [employer]  Nephrologist - [facility] - [facility]

## 2023-11-21 NOTE — PROGRESS NOTES
Subjective     Ms. Ruggiero is a 78 year- old female with past medical history of iron deficiency anemia, recurrent gout, GERD on PPI, type 2 diabetes, hyperlipidemia, hypertension and CKD stage 4 was coming to see me today for follow-up for worsening chronic kidney disease    Last office visit May 2023.  No events in the interim.  Kathya came alone today.  No kidney related complaints or concerns.  She is adherent with medication and low-salt diet.  She started using continuous glucose monitor which is helping her to take care of her diabetes.  Blood pressure at home is reported to be 130-140/70.  Blood pressure elevated in office today-asymptomatic.  She had blood work done on 17 November-all results were discussed with her in details including stable serum creatinine 1.7 and GFR 30.  With normal lecture lites.  Within normal vitamin D and uric acid.  No significant anemia.  She is excited to know that she is doing better from the kidney standpoint      Prior notes  Last office visit January 2023. In interim no events. She came alone today. She reports being in her baseline state of health. Blood pressure is accepted. Restarted Farxiga last office visit-blood sugar has been running 130-140. She had blood work done recently and results were discussed with her in details including stable serum creatinine 1.8 and GFR 28 with a normal electrolytes. She underwent a course of IV iron infusion-significantly improved anemia 12.8 and replete iron storage. Last office visit we started weekly vitamin D for vitamin D deficiency-Vitamin D is now normal. Uric acid was elevated-now normal after starting allopurinol. Overall she feels better     Her prior notes     Last office visit November 2022. Due to concerns regarding volume depletion and low blood pressure causing worsening kidney function we held her allopurinol and chlorthalidone. We also instructed her to hold losartan 100 mg which she did not. In the interim, she did not  have any weight gain or worsening leg swelling. She reports pain in the big toe. She checks blood pressure at home and average reading 120-130/50-60. She had blood work done prior to this visit and all results were discussed with her in details today including slightly improving serum creatinine 1.1 from 2.2 and GFR improved 26 from 22. Uric acid is worsening after holding allopurinol 8.5. She has anemia with hemoglobin 10.9 and iron deficiency. She had SPEP was negative. No albuminuria was bland urine. Vitamin D is low. We discussed all these results with her and plan as below        She is visiting us for CKD 3 and she has denied any symptoms like leg swelling, SOB after stopping HCTZ (as per our instructions last office visit due to worsening kidney function and concerns about volume depletion). She is on low salt diet and she has blood work done on this visit . She is anemic-discussed with her starting iron infusion as opposed to p.o. pills-agreed. He continues to be on losartan 100 mg daily  Her uric acid also elevated and she complained of great toe pain on and off will resume allopurinol and monitor . Her vitamin D is also low and will start on Weekly supplements and patient agreed for the plan. Her electrolytes has been normal and will give follow up in 6 months . she has been improving in her kidney function trying to reach baselin.  Her weight is stable after stopping HCTZ. No protein leak. Will start on SGLT-2 inhibitors.              Per prior notes  Last office visit September 2022. We discussed worsening kidney function. We encouraged appropriate hydration. We stopped metformin for lower GFR. In interim, she feels okay. Her blood pressure has been running high recently. She is on insulin. She denies increased urine output or dehydration. She keeps herself well-hydrated. No NSAID use at home. She started watching her salt. She had blood work done few days ago and all results were discussed with her in  details including worsening serum creatinine 2.2 and worsening GFR 22. Within normal electrolytes. Borderline low sodium reflecting increased fluid intake. Urine analysis is bland with no blood or protein. Uric acid is normal 5.6 on allopurinol 200 mg. She does check blood pressure at home and readings are fluctuating but mostly running on the lower side. I discussed the results with her. We agreed we will follow conservative measures.   Prior notes     Kathya came alone today. She is aware of history of chronic kidney disease stage III. Recently, she reports decreased p.o. fluid intake and fresh greens in her diet. She thinks she might have been dehydrated. She uses NSAIDs occasionally for acute gout flare and joint pain. She reports low blood sugar recently and metformin was held. She noticed her blood sugar has been rising and she resumed taking metformin 1000 mg twice daily. She checks blood pressure at home and average reading 130 over 60s. No lower urinary tract symptoms. No recent contrast exposure. She had blood work done in July 2022 that showed worsening GFR from her baseline 35 down to 28. She had kidney ultrasound done that did not show hydronephrosis or postrenal obstruction.     Social history: Non-smoker, , no alcohol  Family history: Relevant       Objective   /69 (Patient Position: Standing)   Pulse 57   Temp 36.1 °C (96.9 °F)   Resp 18   Ht 1.524 m (5')   Wt 84.6 kg (186 lb 9.6 oz)   SpO2 98%   BMI 36.44 kg/m²   Wt Readings from Last 3 Encounters:   11/21/23 84.6 kg (186 lb 9.6 oz)   10/16/23 84.4 kg (186 lb)   07/13/23 84.8 kg (187 lb)       Physical Exam    General appearance: no distress awake and alert on room air, euvolemic on exam  Eyes: non-icteric  HEENT: atrumatic head, PEERLA, moist mucosa  Skin: no apparent rash  Heart: NSR, S1, S2 normal, no murmur or gallop  Lungs: Symmetrical expansion,CTA bilat no wheezing/crackles  Abdomen: soft, nt/nd, obese  Extremities: no  "edema bilat  Neuro: No FND,asterixis, no focal deficits noticed        Review of Systems     Constitutional: no fever, no chills, no recent weight gain and no recent weight loss.   Eyes: no blurred vision and no diplopia.   ENT: no hearing loss, no earache, no sore throat, no swollen glands in the neck and no nasal discharge.   Cardiovascular: no chest pain, no palpitations and no lower extremity edema.   Respiratory: no shortness of breath, no chronic cough and no shortness of breath during exertion.   Gastrointestinal: no abdominal pain, no constipation, no heartburn, no vomiting, no bloody stools and no change in bowel movements.   Genitourinary: no dysuria and no hematuria.   Musculoskeletal: no arthralgias and no myalgias.   Skin: no rashes and no skin lesions.   Neurological: no headaches and no dizziness.   Psychiatric: no confusion, no depression and no anxiety.   Endocrine: no heat intolerance, no cold intolerance, appetite not increased, no thyroid disorder, no increased urinary frequency and no dry skin.   Hematologic/Lymphatic: does not bleed easily and does not bruise easily.   All other systems have been reviewed and are negative for complaint.         Data Review               Results from last 7 days   Lab Units 11/17/23  1451   SODIUM mmol/L 135*   POTASSIUM mmol/L 5.3   CHLORIDE mmol/L 101   CO2 mmol/L 23   BUN mg/dL 31*   CREATININE mg/dL 1.71*   EGFR mL/min/1.73m*2 30*   GLUCOSE mg/dL 187*   CALCIUM mg/dL 9.2   PHOSPHORUS mg/dL 4.6       Lab Results   Component Value Date    URICACID 4.5 11/17/2023       No components found for: \"[id number]\"      Lab Results   Component Value Date    HGBA1C 8.4 (H) 10/11/2023         Lab Results   Component Value Date    CALCIUM 9.2 11/17/2023    PHOS 4.6 11/17/2023         No results found for requested labs within last 365 days.        Results from last 7 days   Lab Units 11/17/23  1451   SODIUM mmol/L 135*   POTASSIUM mmol/L 5.3   CHLORIDE mmol/L 101   CO2 " mmol/L 23   BUN mg/dL 31*   GLUCOSE mg/dL 187*   CALCIUM mg/dL 9.2   ANION GAP mmol/L 16   EGFR mL/min/1.73m*2 30*             Albumin/Creatine Ratio   Date Value Ref Range Status   04/26/2023 18.2 0.0 - 30.0 ug/mg crt Final   01/23/2023 30.5 (H) 0.0 - 30.0 ug/mg crt Final   11/09/2022 26.9 0.0 - 30.0 ug/mg crt Final       Recent Labs     11/17/23  1451 04/26/23  1355 01/20/23  1405 11/09/22  1200 07/07/22  1225 12/27/21  0946 06/17/21  0930   * 134* 136 132* 136 137 138   K 5.3 5.3 4.9 5.0 5.0 4.6 4.9   CO2 23 24 26 24 26 28 30   BUN 31* 32* 32* 42* 28* 25* 20   GLUCOSE 187* 328* 128* 288* 152* 173* 174*   CALCIUM 9.2 9.5 9.3 9.3 9.3 10.0 9.7   PHOS 4.6 4.1 4.6 4.8  --   --   --    CREATININE 1.71* 1.80* 1.90* 2.22* 1.85* 1.36* 1.35*   EGFR 30*  --   --   --   --   --   --    ANIONGAP 16 15 13 14 15 13 14        Current Outpatient Medications on File Prior to Visit   Medication Sig Dispense Refill    allopurinol (Zyloprim) 100 mg tablet Take by mouth.      amitriptyline (Elavil) 50 mg tablet TAKE 1 TABLET BY MOUTH ONCE DAILY AT BEDTIME 90 tablet 1    aspirin 81 mg capsule Take 1 tablet by mouth once daily.      carvedilol (Coreg) 12.5 mg tablet Take 1 tablet by mouth twice daily 180 tablet 0    ergocalciferol (Vitamin D-2) 1.25 MG (59702 UT) capsule Take 1 capsule by mouth once a week 13 capsule 0    Farxiga 5 mg Take 2 tablets (10 mg) by mouth once daily in the morning. Take before meals.      FreeStyle Gabbi reader (FreeStyle Gabbi 2 [place]) misc Check sugar 4 x daily 1 each 0    FreeStyle Gabbi sensor system (FreeStyle Gabbi 2 Sensor) kit Check sugar 4 times daily 1 each 0    insulin aspart (NovoLOG FlexPen U-100 Insulin) 100 unit/mL (3 mL) pen Inject 10 units 2 times daily before meals 18 mL 12    insulin detemir (Levemir FlexTouch U100 Insulin) 100 unit/mL (3 mL) pen Inject 80 Units under the skin 2 times a day. 144 mL 1    losartan (Cozaar) 100 mg tablet Take 1 tablet (100 mg) by mouth once daily.       "OneTouch Delica Plus Lancet 33 gauge misc       OneTouch Ultra Control solution       OneTouch Ultra Test strip OneTouch Ultra In Vitro Strip   Quantity: 100  Refills: 0        Start : 26-Jul-2022   Active      pantoprazole (ProtoNix) 40 mg EC tablet Take 1 tablet by mouth once daily 90 tablet 2    pen needle, diabetic 31 gauge x 15/64\" needle USE 2  TWICE DAILY 200 each 1    simvastatin (Zocor) 20 mg tablet Take 1 tablet by mouth once daily 90 tablet 3    vit A/vit C/vit E/zinc/copper (VITAMINS A,C,E-ZINC-COPPER ORAL) Take 1 capsule by mouth once daily.      amitriptyline (Elavil) 25 mg tablet Take by mouth.      amLODIPine (Norvasc) 5 mg tablet Take 1 tablet (5 mg) by mouth once daily. (Patient not taking: Reported on 11/21/2023) 90 tablet 1    hydroCHLOROthiazide (HYDRODiuril) 25 mg tablet Take 1 tablet (25 mg) by mouth once daily.      [DISCONTINUED] pen needle, diabetic 31 gauge x 15/64\" needle 2 each by abdominal subcutaneous route 2 times a day. 200 each 1     No current facility-administered medications on file prior to visit.     Assessment and Plan     Ms. 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 Progressively worsening but recently leveling off  -Her diabetes is not well controlled -Discussed with her and she is trying to control her diet-pending repeat A1c per PCP  -New baseline serum creatinine 1.8-2, GFR 20-30 mils per minute 1.73 mÂ²  -Most recent serum creatinine is at baseline  -Stopping HCTZ have improved the kidney function without much effect on her volume status-she continues to be euvolemic  -Kidney ultrasound done in August 2022 was reviewed and showed increased " echogenicity bilateral with no postrenal obstruction. Multiple cysts were identified. In the setting of worsening kidney function-we repeated kidney ultrasound in December 2022-no obstruction but with increased echogenicity bilateral  -We will follow conservative measures. Encouraged good hydration. Continue same medications     #Anemia-iron deficiency anemia/chronic kidney disease-improved status post IV iron infusion  -Within normal SPEP, folic acid, vitamin B12 levels  -Iron storage results are normal  -Hemoglobin is better 12.8 from 10.4 earlier     #Hypertension-reported to be controlled per prior office visits and in this office visit  -Current medication include carvedilol 25 mg twice daily, amlodipine 5 mg,   HCTZ On hold-since then no symptoms and no leg swelling   On losartan 100 mg will continue.     #Type 2 diabetes-not well controlled.   -A1c 8.9  -started on Farxiga 10 mg- would benefit for diabetes control with preserving kidney function.     #Recurrent gout-on allopurinol 200 mg-now uric acid is normal-continue same        #CKD/ MBD- unremarkable PTH, phosphorus, vitamin D     #CVS- high risk. On simvastatin 20 mg once daily     Follow-up in 3-6 months with repeat blood work and urinalysis prior to next visit.         Erasmo Hunter MD, MS, JELLY ALVARADO  Clinical  - [employer]  Nephrologist - [facility]

## 2023-12-08 DIAGNOSIS — I10 HYPERTENSION, UNSPECIFIED TYPE: ICD-10-CM

## 2023-12-08 RX ORDER — CARVEDILOL 12.5 MG/1
12.5 TABLET ORAL 2 TIMES DAILY
Qty: 180 TABLET | Refills: 0 | Status: SHIPPED | OUTPATIENT
Start: 2023-12-08 | End: 2024-03-11

## 2024-01-02 DIAGNOSIS — M10.00 ACUTE IDIOPATHIC GOUT, UNSPECIFIED SITE: Primary | ICD-10-CM

## 2024-01-02 RX ORDER — ALLOPURINOL 100 MG/1
200 TABLET ORAL DAILY
Qty: 180 TABLET | Refills: 0 | Status: SHIPPED | OUTPATIENT
Start: 2024-01-02 | End: 2024-03-27

## 2024-01-16 DIAGNOSIS — E55.9 VITAMIN D DEFICIENCY: ICD-10-CM

## 2024-01-16 RX ORDER — ERGOCALCIFEROL 1.25 MG/1
1 CAPSULE ORAL
Qty: 13 CAPSULE | Refills: 0 | Status: SHIPPED | OUTPATIENT
Start: 2024-01-16 | End: 2024-04-18

## 2024-01-17 ENCOUNTER — APPOINTMENT (OUTPATIENT)
Dept: PRIMARY CARE | Facility: CLINIC | Age: 79
End: 2024-01-17
Payer: MEDICARE

## 2024-01-22 DIAGNOSIS — E11.9 DIABETES MELLITUS WITHOUT COMPLICATION (MULTI): ICD-10-CM

## 2024-01-22 RX ORDER — FLASH GLUCOSE SENSOR
KIT MISCELLANEOUS
Qty: 4 EACH | Refills: 0 | Status: SHIPPED | OUTPATIENT
Start: 2024-01-22

## 2024-01-22 NOTE — TELEPHONE ENCOUNTER
Requested Prescriptions     Pending Prescriptions Disp Refills    FreeStyle Gabbi sensor system (FreeStyle Gabbi 2 Sensor) kit 4 each 0     Sig: Check sugar 4 times daily

## 2024-01-29 DIAGNOSIS — R73.01 IFG (IMPAIRED FASTING GLUCOSE): ICD-10-CM

## 2024-01-29 DIAGNOSIS — G47.00 INSOMNIA, UNSPECIFIED TYPE: ICD-10-CM

## 2024-01-29 DIAGNOSIS — E78.5 HYPERLIPIDEMIA, UNSPECIFIED HYPERLIPIDEMIA TYPE: ICD-10-CM

## 2024-01-29 RX ORDER — AMITRIPTYLINE HYDROCHLORIDE 50 MG/1
50 TABLET, FILM COATED ORAL NIGHTLY
Qty: 90 TABLET | Refills: 1 | Status: SHIPPED | OUTPATIENT
Start: 2024-01-29

## 2024-01-29 NOTE — PROGRESS NOTES
Orders Placed This Encounter   Procedures    CBC     Standing Status:   Future     Standing Expiration Date:   1/29/2025     Order Specific Question:   Release result to MyChart     Answer:   Immediate    Comprehensive Metabolic Panel     Standing Status:   Future     Standing Expiration Date:   1/29/2025     Order Specific Question:   Release result to MyChart     Answer:   Immediate    Hemoglobin A1C     Standing Status:   Future     Standing Expiration Date:   1/29/2025     Order Specific Question:   Release result to MyChart     Answer:   Immediate    Lipid Panel     Standing Status:   Future     Standing Expiration Date:   1/29/2025     Order Specific Question:   Release result to MyChart     Answer:   Immediate

## 2024-01-29 NOTE — TELEPHONE ENCOUNTER
Requested Prescriptions     Pending Prescriptions Disp Refills    amitriptyline (Elavil) 50 mg tablet 90 tablet 1     Sig: Take 1 tablet (50 mg) by mouth once daily at bedtime.

## 2024-02-01 ENCOUNTER — LAB (OUTPATIENT)
Dept: LAB | Facility: LAB | Age: 79
End: 2024-02-01
Payer: MEDICARE

## 2024-02-01 DIAGNOSIS — E78.5 HYPERLIPIDEMIA, UNSPECIFIED HYPERLIPIDEMIA TYPE: ICD-10-CM

## 2024-02-01 DIAGNOSIS — R73.01 IFG (IMPAIRED FASTING GLUCOSE): ICD-10-CM

## 2024-02-01 LAB
ALBUMIN SERPL BCP-MCNC: 4.1 G/DL (ref 3.4–5)
ALP SERPL-CCNC: 105 U/L (ref 33–136)
ALT SERPL W P-5'-P-CCNC: 10 U/L (ref 7–45)
ANION GAP SERPL CALC-SCNC: 16 MMOL/L (ref 10–20)
AST SERPL W P-5'-P-CCNC: 14 U/L (ref 9–39)
BILIRUB SERPL-MCNC: 0.6 MG/DL (ref 0–1.2)
BUN SERPL-MCNC: 30 MG/DL (ref 6–23)
CALCIUM SERPL-MCNC: 9 MG/DL (ref 8.6–10.3)
CHLORIDE SERPL-SCNC: 102 MMOL/L (ref 98–107)
CHOLEST SERPL-MCNC: 144 MG/DL (ref 0–199)
CHOLESTEROL/HDL RATIO: 4
CO2 SERPL-SCNC: 23 MMOL/L (ref 21–32)
CREAT SERPL-MCNC: 1.95 MG/DL (ref 0.5–1.05)
EGFRCR SERPLBLD CKD-EPI 2021: 26 ML/MIN/1.73M*2
ERYTHROCYTE [DISTWIDTH] IN BLOOD BY AUTOMATED COUNT: 14.3 % (ref 11.5–14.5)
GLUCOSE SERPL-MCNC: 106 MG/DL (ref 74–99)
HCT VFR BLD AUTO: 38.9 % (ref 36–46)
HDLC SERPL-MCNC: 36.4 MG/DL
HGB BLD-MCNC: 12 G/DL (ref 12–16)
LDLC SERPL CALC-MCNC: 67 MG/DL
MCH RBC QN AUTO: 29.9 PG (ref 26–34)
MCHC RBC AUTO-ENTMCNC: 30.8 G/DL (ref 32–36)
MCV RBC AUTO: 97 FL (ref 80–100)
NON HDL CHOLESTEROL: 108 MG/DL (ref 0–149)
NRBC BLD-RTO: 0 /100 WBCS (ref 0–0)
PLATELET # BLD AUTO: 265 X10*3/UL (ref 150–450)
POTASSIUM SERPL-SCNC: 5.2 MMOL/L (ref 3.5–5.3)
PROT SERPL-MCNC: 7.3 G/DL (ref 6.4–8.2)
RBC # BLD AUTO: 4.02 X10*6/UL (ref 4–5.2)
SODIUM SERPL-SCNC: 136 MMOL/L (ref 136–145)
TRIGL SERPL-MCNC: 201 MG/DL (ref 0–149)
VLDL: 40 MG/DL (ref 0–40)
WBC # BLD AUTO: 14.5 X10*3/UL (ref 4.4–11.3)

## 2024-02-01 PROCEDURE — 85027 COMPLETE CBC AUTOMATED: CPT

## 2024-02-01 PROCEDURE — 80053 COMPREHEN METABOLIC PANEL: CPT

## 2024-02-01 PROCEDURE — 36415 COLL VENOUS BLD VENIPUNCTURE: CPT

## 2024-02-01 PROCEDURE — 80061 LIPID PANEL: CPT

## 2024-02-01 PROCEDURE — 83036 HEMOGLOBIN GLYCOSYLATED A1C: CPT

## 2024-02-02 ENCOUNTER — APPOINTMENT (OUTPATIENT)
Dept: PRIMARY CARE | Facility: CLINIC | Age: 79
End: 2024-02-02
Payer: MEDICARE

## 2024-02-02 DIAGNOSIS — E11.9 DIABETES MELLITUS WITHOUT COMPLICATION (MULTI): Primary | ICD-10-CM

## 2024-02-02 LAB
EST. AVERAGE GLUCOSE BLD GHB EST-MCNC: 160 MG/DL
HBA1C MFR BLD: 7.2 %

## 2024-02-02 NOTE — TELEPHONE ENCOUNTER
Requested Prescriptions     Pending Prescriptions Disp Refills    insulin glargine (Lantus Solostar U-100 Insulin) 100 unit/mL (3 mL) pen 144 mL 1     Sig: Inject 80 Units under the skin 2 times a day. Take as directed per insulin instructions.

## 2024-02-04 RX ORDER — INSULIN GLARGINE 100 [IU]/ML
80 INJECTION, SOLUTION SUBCUTANEOUS 2 TIMES DAILY
Qty: 144 ML | Refills: 1 | Status: SHIPPED | OUTPATIENT
Start: 2024-02-04 | End: 2024-02-23 | Stop reason: SDUPTHER

## 2024-02-09 ENCOUNTER — APPOINTMENT (OUTPATIENT)
Dept: PRIMARY CARE | Facility: CLINIC | Age: 79
End: 2024-02-09
Payer: MEDICARE

## 2024-02-22 NOTE — PROGRESS NOTES
Subjective   Patient ID: Kathya Ruggiero is a 78 y.o. female who presents for Sick Visit (Follow up from urgent care ).    HPI     Reports dry cough x 1 month   No fever, chills., night sweats  No SOB  Does endorse rhinitis with PND   COVID neg at home   Was seen at urgent care 10 days ago- given augmentin    Review of Systems   All other systems reviewed and are negative.      Objective   There were no vitals taken for this visit.    Physical Exam  Constitutional:       Appearance: Normal appearance.   HENT:      Head: Normocephalic and atraumatic.   Cardiovascular:      Rate and Rhythm: Normal rate and regular rhythm.      Heart sounds: Normal heart sounds. No murmur heard.     No gallop.   Pulmonary:      Effort: Pulmonary effort is normal. No respiratory distress.      Breath sounds: No wheezing or rales.   Skin:     General: Skin is warm and dry.      Findings: No rash.   Neurological:      Mental Status: She is alert and oriented to person, place, and time. Mental status is at baseline.   Psychiatric:         Mood and Affect: Mood normal.         Behavior: Behavior normal.         Assessment/Plan       #Cough   -Order CXR  -Order Flonase for rhinitis, suspect     #DM2  -Well controlled   -Decrease Lantus to 70 units BID. Cont Novolog 20U AC and Farxiga 5mg daily    RTC 3/2024

## 2024-02-23 ENCOUNTER — HOSPITAL ENCOUNTER (OUTPATIENT)
Dept: RADIOLOGY | Facility: HOSPITAL | Age: 79
Discharge: HOME | End: 2024-02-23
Payer: MEDICARE

## 2024-02-23 ENCOUNTER — OFFICE VISIT (OUTPATIENT)
Dept: PRIMARY CARE | Facility: CLINIC | Age: 79
End: 2024-02-23
Payer: MEDICARE

## 2024-02-23 VITALS
BODY MASS INDEX: 36.52 KG/M2 | DIASTOLIC BLOOD PRESSURE: 60 MMHG | HEART RATE: 68 BPM | OXYGEN SATURATION: 90 % | SYSTOLIC BLOOD PRESSURE: 119 MMHG | WEIGHT: 187 LBS

## 2024-02-23 DIAGNOSIS — E11.9 DIABETES MELLITUS WITHOUT COMPLICATION (MULTI): ICD-10-CM

## 2024-02-23 DIAGNOSIS — R05.9 COUGH, UNSPECIFIED TYPE: ICD-10-CM

## 2024-02-23 DIAGNOSIS — R05.9 COUGH, UNSPECIFIED TYPE: Primary | ICD-10-CM

## 2024-02-23 PROCEDURE — 1036F TOBACCO NON-USER: CPT | Performed by: INTERNAL MEDICINE

## 2024-02-23 PROCEDURE — 3074F SYST BP LT 130 MM HG: CPT | Performed by: INTERNAL MEDICINE

## 2024-02-23 PROCEDURE — 3078F DIAST BP <80 MM HG: CPT | Performed by: INTERNAL MEDICINE

## 2024-02-23 PROCEDURE — 1160F RVW MEDS BY RX/DR IN RCRD: CPT | Performed by: INTERNAL MEDICINE

## 2024-02-23 PROCEDURE — 71046 X-RAY EXAM CHEST 2 VIEWS: CPT

## 2024-02-23 PROCEDURE — 1159F MED LIST DOCD IN RCRD: CPT | Performed by: INTERNAL MEDICINE

## 2024-02-23 PROCEDURE — 1126F AMNT PAIN NOTED NONE PRSNT: CPT | Performed by: INTERNAL MEDICINE

## 2024-02-23 PROCEDURE — 99214 OFFICE O/P EST MOD 30 MIN: CPT | Performed by: INTERNAL MEDICINE

## 2024-02-23 PROCEDURE — 71046 X-RAY EXAM CHEST 2 VIEWS: CPT | Performed by: RADIOLOGY

## 2024-02-23 RX ORDER — FLUTICASONE PROPIONATE 50 MCG
2 SPRAY, SUSPENSION (ML) NASAL DAILY
Qty: 16 G | Refills: 2 | Status: SHIPPED | OUTPATIENT
Start: 2024-02-23 | End: 2025-02-22

## 2024-02-23 RX ORDER — INSULIN GLARGINE 100 [IU]/ML
70 INJECTION, SOLUTION SUBCUTANEOUS 2 TIMES DAILY
Qty: 144 ML | Refills: 1 | Status: SHIPPED | OUTPATIENT
Start: 2024-02-23 | End: 2024-05-23

## 2024-03-07 NOTE — PROGRESS NOTES
Subjective   Patient ID: Kathya Ruggiero is a 79 y.o. female who presents for Follow-up (3 month follow up ) and Medicare Annual Wellness Visit Subsequent.    HPI     7 days glucose ave 120, having several lows (50s) overnight    Review of Systems   All other systems reviewed and are negative.      Objective   There were no vitals taken for this visit.    Physical Exam  Constitutional:       Appearance: Normal appearance.   HENT:      Head: Normocephalic and atraumatic.   Cardiovascular:      Rate and Rhythm: Normal rate and regular rhythm.      Heart sounds: Normal heart sounds. No murmur heard.     No gallop.   Pulmonary:      Effort: Pulmonary effort is normal. No respiratory distress.      Breath sounds: No wheezing or rales.   Skin:     General: Skin is warm and dry.      Findings: No rash.   Neurological:      Mental Status: She is alert and oriented to person, place, and time. Mental status is at baseline.   Psychiatric:         Mood and Affect: Mood normal.         Behavior: Behavior normal.         Assessment/Plan       #DM2  -A1C 7.2%   -Cont Farxiga 5mg daily  -Decrease Lantus 60 units BID due to hypoglycemia   -Cont Novolog 12U AC     #HTN  -BP is good at home. Cont losartan 100mg daily and carvedilol 12.5mg BID      #HLD  -Cont simvastatin 20mg     #Insomia  -Continue amitriptyline 50mg nightly      #CKD4  -Following with Dr. Hunter. Will get labs in May      #Gout   -Cont allopurinol 100mg        Influenza: 10/16/23   COVID: x3   Prevnar 13: UTD   Pneumovax 23: UTD   Shingrix: x2  Mamm: 6/29/21  DEXA: 6/10/2020--normal   Pap: NI   Colorectal ca: 9/12/13  Lung ca screening: NI

## 2024-03-08 ENCOUNTER — OFFICE VISIT (OUTPATIENT)
Dept: PRIMARY CARE | Facility: CLINIC | Age: 79
End: 2024-03-08
Payer: MEDICARE

## 2024-03-08 VITALS
HEIGHT: 60 IN | BODY MASS INDEX: 36.91 KG/M2 | SYSTOLIC BLOOD PRESSURE: 116 MMHG | DIASTOLIC BLOOD PRESSURE: 58 MMHG | WEIGHT: 188 LBS | HEART RATE: 56 BPM | OXYGEN SATURATION: 95 %

## 2024-03-08 DIAGNOSIS — E11.22 TYPE 2 DIABETES MELLITUS WITH STAGE 4 CHRONIC KIDNEY DISEASE, WITH LONG-TERM CURRENT USE OF INSULIN (MULTI): ICD-10-CM

## 2024-03-08 DIAGNOSIS — E66.01 CLASS 2 SEVERE OBESITY WITH BODY MASS INDEX (BMI) OF 35 TO 39.9 WITH SERIOUS COMORBIDITY (MULTI): ICD-10-CM

## 2024-03-08 DIAGNOSIS — Z12.31 SCREENING MAMMOGRAM FOR BREAST CANCER: ICD-10-CM

## 2024-03-08 DIAGNOSIS — I10 HYPERTENSION, UNSPECIFIED TYPE: ICD-10-CM

## 2024-03-08 DIAGNOSIS — Z79.4 TYPE 2 DIABETES MELLITUS WITH STAGE 4 CHRONIC KIDNEY DISEASE, WITH LONG-TERM CURRENT USE OF INSULIN (MULTI): ICD-10-CM

## 2024-03-08 DIAGNOSIS — N18.4 TYPE 2 DIABETES MELLITUS WITH STAGE 4 CHRONIC KIDNEY DISEASE, WITH LONG-TERM CURRENT USE OF INSULIN (MULTI): ICD-10-CM

## 2024-03-08 DIAGNOSIS — Z00.00 ROUTINE GENERAL MEDICAL EXAMINATION AT HEALTH CARE FACILITY: Primary | ICD-10-CM

## 2024-03-08 DIAGNOSIS — I10 PRIMARY HYPERTENSION: ICD-10-CM

## 2024-03-08 PROCEDURE — 3078F DIAST BP <80 MM HG: CPT | Performed by: INTERNAL MEDICINE

## 2024-03-08 PROCEDURE — 1159F MED LIST DOCD IN RCRD: CPT | Performed by: INTERNAL MEDICINE

## 2024-03-08 PROCEDURE — 3074F SYST BP LT 130 MM HG: CPT | Performed by: INTERNAL MEDICINE

## 2024-03-08 PROCEDURE — 1160F RVW MEDS BY RX/DR IN RCRD: CPT | Performed by: INTERNAL MEDICINE

## 2024-03-08 PROCEDURE — 1126F AMNT PAIN NOTED NONE PRSNT: CPT | Performed by: INTERNAL MEDICINE

## 2024-03-08 PROCEDURE — 99214 OFFICE O/P EST MOD 30 MIN: CPT | Performed by: INTERNAL MEDICINE

## 2024-03-08 PROCEDURE — 1124F ACP DISCUSS-NO DSCNMKR DOCD: CPT | Performed by: INTERNAL MEDICINE

## 2024-03-08 PROCEDURE — 1036F TOBACCO NON-USER: CPT | Performed by: INTERNAL MEDICINE

## 2024-03-08 PROCEDURE — G0439 PPPS, SUBSEQ VISIT: HCPCS | Performed by: INTERNAL MEDICINE

## 2024-03-08 PROCEDURE — 1170F FXNL STATUS ASSESSED: CPT | Performed by: INTERNAL MEDICINE

## 2024-03-08 RX ORDER — INSULIN ASPART 100 [IU]/ML
INJECTION, SOLUTION INTRAVENOUS; SUBCUTANEOUS
Qty: 18 ML | Refills: 12 | Status: SHIPPED | OUTPATIENT
Start: 2024-03-08

## 2024-03-08 ASSESSMENT — ACTIVITIES OF DAILY LIVING (ADL)
BATHING: INDEPENDENT
DOING_HOUSEWORK: INDEPENDENT
TAKING_MEDICATION: INDEPENDENT
MANAGING_FINANCES: INDEPENDENT
GROCERY_SHOPPING: INDEPENDENT
DRESSING: INDEPENDENT

## 2024-03-08 NOTE — PATIENT INSTRUCTIONS
Decrease Lantus to 60 units in am and pm, Cont other meds as is    RSV and Prevnar 20 for vaccines  Mammogram [phone]    6 months

## 2024-03-10 NOTE — TELEPHONE ENCOUNTER
Requested Prescriptions     Pending Prescriptions Disp Refills    carvedilol (Coreg) 12.5 mg tablet [Pharmacy Med Name: Carvedilol 12.5 MG Oral Tablet] 180 tablet 2     Sig: Take 1 tablet by mouth twice daily

## 2024-03-11 RX ORDER — CARVEDILOL 12.5 MG/1
12.5 TABLET ORAL 2 TIMES DAILY
Qty: 180 TABLET | Refills: 2 | Status: SHIPPED | OUTPATIENT
Start: 2024-03-11

## 2024-03-21 ENCOUNTER — HOSPITAL ENCOUNTER (OUTPATIENT)
Dept: RADIOLOGY | Facility: HOSPITAL | Age: 79
Discharge: HOME | End: 2024-03-21
Payer: MEDICARE

## 2024-03-21 VITALS — BODY MASS INDEX: 35.3 KG/M2 | WEIGHT: 187 LBS | HEIGHT: 61 IN

## 2024-03-21 DIAGNOSIS — Z12.31 SCREENING MAMMOGRAM FOR BREAST CANCER: ICD-10-CM

## 2024-03-21 PROCEDURE — 77067 SCR MAMMO BI INCL CAD: CPT | Performed by: RADIOLOGY

## 2024-03-21 PROCEDURE — 77067 SCR MAMMO BI INCL CAD: CPT | Mod: RT

## 2024-03-21 PROCEDURE — 77067 SCR MAMMO BI INCL CAD: CPT

## 2024-03-21 PROCEDURE — 77063 BREAST TOMOSYNTHESIS BI: CPT | Performed by: RADIOLOGY

## 2024-03-27 DIAGNOSIS — M10.00 ACUTE IDIOPATHIC GOUT, UNSPECIFIED SITE: ICD-10-CM

## 2024-03-27 RX ORDER — ALLOPURINOL 100 MG/1
200 TABLET ORAL DAILY
Qty: 180 TABLET | Refills: 0 | Status: SHIPPED | OUTPATIENT
Start: 2024-03-27 | End: 2024-05-29 | Stop reason: SDUPTHER

## 2024-04-04 DIAGNOSIS — E78.5 HYPERLIPIDEMIA, UNSPECIFIED HYPERLIPIDEMIA TYPE: ICD-10-CM

## 2024-04-04 DIAGNOSIS — I10 PRIMARY HYPERTENSION: Primary | ICD-10-CM

## 2024-04-05 RX ORDER — SIMVASTATIN 20 MG/1
TABLET, FILM COATED ORAL
Qty: 90 TABLET | Refills: 2 | Status: SHIPPED | OUTPATIENT
Start: 2024-04-05

## 2024-04-05 RX ORDER — LOSARTAN POTASSIUM 100 MG/1
100 TABLET ORAL DAILY
Qty: 90 TABLET | Refills: 2 | Status: SHIPPED | OUTPATIENT
Start: 2024-04-05

## 2024-04-17 DIAGNOSIS — E55.9 VITAMIN D DEFICIENCY: ICD-10-CM

## 2024-04-18 RX ORDER — ERGOCALCIFEROL 1.25 MG/1
1 CAPSULE ORAL
Qty: 13 CAPSULE | Refills: 0 | Status: SHIPPED | OUTPATIENT
Start: 2024-04-21

## 2024-04-19 DIAGNOSIS — E11.22 TYPE 2 DIABETES MELLITUS WITH STAGE 4 CHRONIC KIDNEY DISEASE, WITH LONG-TERM CURRENT USE OF INSULIN (MULTI): ICD-10-CM

## 2024-04-19 DIAGNOSIS — Z79.4 TYPE 2 DIABETES MELLITUS WITH STAGE 4 CHRONIC KIDNEY DISEASE, WITH LONG-TERM CURRENT USE OF INSULIN (MULTI): ICD-10-CM

## 2024-04-19 DIAGNOSIS — N18.4 TYPE 2 DIABETES MELLITUS WITH STAGE 4 CHRONIC KIDNEY DISEASE, WITH LONG-TERM CURRENT USE OF INSULIN (MULTI): ICD-10-CM

## 2024-04-22 RX ORDER — PEN NEEDLE, DIABETIC 32GX 5/32"
NEEDLE, DISPOSABLE MISCELLANEOUS
Qty: 200 EACH | Refills: 2 | Status: SHIPPED | OUTPATIENT
Start: 2024-04-22

## 2024-04-22 NOTE — TELEPHONE ENCOUNTER
"Requested Prescriptions     Pending Prescriptions Disp Refills    pen needle, diabetic 31 gauge x 15/64\" needle [Pharmacy Med Name: RELION PEN NEEDLES [id number] MIS] 200 each 2     Sig: USE 2  TWICE DAILY       "

## 2024-05-24 ENCOUNTER — LAB (OUTPATIENT)
Dept: LAB | Facility: LAB | Age: 79
End: 2024-05-24
Payer: MEDICARE

## 2024-05-24 DIAGNOSIS — I10 PRIMARY HYPERTENSION: ICD-10-CM

## 2024-05-24 DIAGNOSIS — E55.9 VITAMIN D DEFICIENCY: ICD-10-CM

## 2024-05-24 DIAGNOSIS — N18.32 STAGE 3B CHRONIC KIDNEY DISEASE (MULTI): ICD-10-CM

## 2024-05-24 DIAGNOSIS — E66.01 CLASS 2 SEVERE OBESITY WITH BODY MASS INDEX (BMI) OF 35 TO 39.9 WITH SERIOUS COMORBIDITY (MULTI): ICD-10-CM

## 2024-05-24 DIAGNOSIS — M10.00 ACUTE IDIOPATHIC GOUT, UNSPECIFIED SITE: ICD-10-CM

## 2024-05-24 DIAGNOSIS — D50.8 OTHER IRON DEFICIENCY ANEMIA: ICD-10-CM

## 2024-05-24 LAB
25(OH)D3 SERPL-MCNC: 74 NG/ML (ref 30–100)
ALBUMIN SERPL BCP-MCNC: 4.1 G/DL (ref 3.4–5)
ANION GAP SERPL CALC-SCNC: 15 MMOL/L (ref 10–20)
BUN SERPL-MCNC: 36 MG/DL (ref 6–23)
CALCIUM SERPL-MCNC: 8.8 MG/DL (ref 8.6–10.3)
CHLORIDE SERPL-SCNC: 105 MMOL/L (ref 98–107)
CO2 SERPL-SCNC: 24 MMOL/L (ref 21–32)
CREAT SERPL-MCNC: 1.8 MG/DL (ref 0.5–1.05)
CREAT UR-MCNC: 74.8 MG/DL (ref 20–320)
EGFRCR SERPLBLD CKD-EPI 2021: 28 ML/MIN/1.73M*2
ERYTHROCYTE [DISTWIDTH] IN BLOOD BY AUTOMATED COUNT: 15.3 % (ref 11.5–14.5)
FERRITIN SERPL-MCNC: 324 NG/ML (ref 8–150)
GLUCOSE SERPL-MCNC: 124 MG/DL (ref 74–99)
HCT VFR BLD AUTO: 39.9 % (ref 36–46)
HGB BLD-MCNC: 12.4 G/DL (ref 12–16)
IRON SATN MFR SERPL: 35 % (ref 25–45)
IRON SERPL-MCNC: 90 UG/DL (ref 35–150)
MCH RBC QN AUTO: 30 PG (ref 26–34)
MCHC RBC AUTO-ENTMCNC: 31.1 G/DL (ref 32–36)
MCV RBC AUTO: 96 FL (ref 80–100)
MICROALBUMIN UR-MCNC: 47.4 MG/L
MICROALBUMIN/CREAT UR: 63.4 UG/MG CREAT
NRBC BLD-RTO: 0 /100 WBCS (ref 0–0)
PHOSPHATE SERPL-MCNC: 5 MG/DL (ref 2.5–4.9)
PLATELET # BLD AUTO: 211 X10*3/UL (ref 150–450)
POTASSIUM SERPL-SCNC: 5.1 MMOL/L (ref 3.5–5.3)
PTH-INTACT SERPL-MCNC: 115.5 PG/ML (ref 18.5–88)
RBC # BLD AUTO: 4.14 X10*6/UL (ref 4–5.2)
SODIUM SERPL-SCNC: 139 MMOL/L (ref 136–145)
TIBC SERPL-MCNC: 255 UG/DL (ref 240–445)
UIBC SERPL-MCNC: 165 UG/DL (ref 110–370)
URATE SERPL-MCNC: 4.3 MG/DL (ref 2.3–6.7)
WBC # BLD AUTO: 8.9 X10*3/UL (ref 4.4–11.3)

## 2024-05-24 PROCEDURE — 83970 ASSAY OF PARATHORMONE: CPT

## 2024-05-24 PROCEDURE — 80069 RENAL FUNCTION PANEL: CPT

## 2024-05-24 PROCEDURE — 84550 ASSAY OF BLOOD/URIC ACID: CPT

## 2024-05-24 PROCEDURE — 83550 IRON BINDING TEST: CPT

## 2024-05-24 PROCEDURE — 83540 ASSAY OF IRON: CPT

## 2024-05-24 PROCEDURE — 82570 ASSAY OF URINE CREATININE: CPT

## 2024-05-24 PROCEDURE — 36415 COLL VENOUS BLD VENIPUNCTURE: CPT

## 2024-05-24 PROCEDURE — 82043 UR ALBUMIN QUANTITATIVE: CPT

## 2024-05-24 PROCEDURE — 85027 COMPLETE CBC AUTOMATED: CPT

## 2024-05-24 PROCEDURE — 82306 VITAMIN D 25 HYDROXY: CPT

## 2024-05-24 PROCEDURE — 82728 ASSAY OF FERRITIN: CPT

## 2024-05-29 ENCOUNTER — OFFICE VISIT (OUTPATIENT)
Dept: NEPHROLOGY | Facility: CLINIC | Age: 79
End: 2024-05-29
Payer: MEDICARE

## 2024-05-29 VITALS
BODY MASS INDEX: 36.85 KG/M2 | OXYGEN SATURATION: 94 % | HEIGHT: 61 IN | RESPIRATION RATE: 16 BRPM | WEIGHT: 195.2 LBS | HEART RATE: 54 BPM | SYSTOLIC BLOOD PRESSURE: 144 MMHG | DIASTOLIC BLOOD PRESSURE: 77 MMHG | TEMPERATURE: 97.3 F

## 2024-05-29 DIAGNOSIS — E55.9 VITAMIN D DEFICIENCY: ICD-10-CM

## 2024-05-29 DIAGNOSIS — E66.01 CLASS 2 SEVERE OBESITY WITH BODY MASS INDEX (BMI) OF 35 TO 39.9 WITH SERIOUS COMORBIDITY (MULTI): ICD-10-CM

## 2024-05-29 DIAGNOSIS — N18.32 STAGE 3B CHRONIC KIDNEY DISEASE (MULTI): Primary | ICD-10-CM

## 2024-05-29 DIAGNOSIS — M10.00 ACUTE IDIOPATHIC GOUT, UNSPECIFIED SITE: ICD-10-CM

## 2024-05-29 DIAGNOSIS — I10 PRIMARY HYPERTENSION: ICD-10-CM

## 2024-05-29 DIAGNOSIS — D50.8 OTHER IRON DEFICIENCY ANEMIA: ICD-10-CM

## 2024-05-29 PROCEDURE — 1160F RVW MEDS BY RX/DR IN RCRD: CPT | Performed by: INTERNAL MEDICINE

## 2024-05-29 PROCEDURE — 1159F MED LIST DOCD IN RCRD: CPT | Performed by: INTERNAL MEDICINE

## 2024-05-29 PROCEDURE — 3077F SYST BP >= 140 MM HG: CPT | Performed by: INTERNAL MEDICINE

## 2024-05-29 PROCEDURE — 3078F DIAST BP <80 MM HG: CPT | Performed by: INTERNAL MEDICINE

## 2024-05-29 PROCEDURE — 99215 OFFICE O/P EST HI 40 MIN: CPT | Performed by: INTERNAL MEDICINE

## 2024-05-29 RX ORDER — DAPAGLIFLOZIN 5 MG/1
10 TABLET, FILM COATED ORAL
Qty: 180 TABLET | Refills: 3 | Status: SHIPPED | OUTPATIENT
Start: 2024-05-29 | End: 2025-05-29

## 2024-05-29 RX ORDER — ALLOPURINOL 100 MG/1
200 TABLET ORAL DAILY
Qty: 180 TABLET | Refills: 3 | Status: SHIPPED | OUTPATIENT
Start: 2024-05-29 | End: 2025-05-29

## 2024-05-29 RX ORDER — DAPAGLIFLOZIN 10 MG/1
10 TABLET, FILM COATED ORAL DAILY
Qty: 90 TABLET | Refills: 3 | Status: SHIPPED | OUTPATIENT
Start: 2024-05-29 | End: 2025-05-29

## 2024-05-29 NOTE — PATIENT INSTRUCTIONS
Dear JUAN   It was nice seeing you in the nephrology clinic today     Today we discussed the following:     #Chronic kidney disease stage 3/4 most likely diabetes and hypertension effect. Your kidney function is now stable about 25-30% (this is new baseline now). Continue to hold water pills. I want you to continue losartan and Keep yourself well-hydrated     #Uric acid is elevated-now normal. Continue allopurinol     #Hypertension-blood pressure is accepted at home-continue losartan 100 mg. Do not take hydrochlorothiazide/water pill        #Diabetes-continue to hold metformin. Continue Farxiga 10 mg- this class of medication might increase risk of urinary tract infection. Keep the genital area clean and dry. Don't hold urine (counseled)     #Anemia with low iron-improved significantly after IV iron infusion     #Low vitamin D-now normal. Continue vitamin D supplements weekly        Follow-up in 6 months with repeat blood work and urinalysis prior to next visit.     Please call our office if you have any question  Thank you for coming to see me today     Erasmo Hunter MD, MS, JELLY ALVARADO  Clinical  - [employer] [facility] [facility] [employer]  Nephrologist - [facility] - [facility]

## 2024-05-29 NOTE — PROGRESS NOTES
Subjective     Kathya Ruggiero is a 79 y.o. female who has past medical history of  IDDM-II, HLD, HTN, Vit D deficiency, CKD-III/IV (baseline GFR 25 - 30%), YOHAN, MDD/ CRHISTY, gout, sciatic of rt. Side, rt. Lumbar radiculopathy presents for 6 month follow up for CKD.     Seen and examined today in office. Patient had no active or acute complaints. We discussed her kidney function is improving. Her protein leak is better. She had hyperphosphatemia which is likely from increased ice cream intake. We discussed limiting ice cream intake. Overall, doing well. Patient also brought home BP reads. States her BP is well at home.     Per prior Note:    Last office visit May 2023.  No events in the interim.  Kathya came alone today.  No kidney related complaints or concerns.  She is adherent with medication and low-salt diet.  She started using continuous glucose monitor which is helping her to take care of her diabetes.  Blood pressure at home is reported to be 130-140/70.  Blood pressure elevated in office today-asymptomatic.  She had blood work done on 17 November-all results were discussed with her in details including stable serum creatinine 1.7 and GFR 30.  With normal lecture lites.  Within normal vitamin D and uric acid.  No significant anemia.  She is excited to know that she is doing better from the kidney standpoint     Prior notes    Last office visit January 2023. In interim no events. She came alone today. She reports being in her baseline state of health. Blood pressure is accepted. Restarted Farxiga last office visit-blood sugar has been running 130-140. She had blood work done recently and results were discussed with her in details including stable serum creatinine 1.8 and GFR 28 with a normal electrolytes. She underwent a course of IV iron infusion-significantly improved anemia 12.8 and replete iron storage. Last office visit we started weekly vitamin D for vitamin D deficiency-Vitamin D is now normal. Uric acid was  elevated-now normal after starting allopurinol. Overall she feels better     Her prior notes     Last office visit November 2022. Due to concerns regarding volume depletion and low blood pressure causing worsening kidney function we held her allopurinol and chlorthalidone. We also instructed her to hold losartan 100 mg which she did not. In the interim, she did not have any weight gain or worsening leg swelling. She reports pain in the big toe. She checks blood pressure at home and average reading 120-130/50-60. She had blood work done prior to this visit and all results were discussed with her in details today including slightly improving serum creatinine 1.1 from 2.2 and GFR improved 26 from 22. Uric acid is worsening after holding allopurinol 8.5. She has anemia with hemoglobin 10.9 and iron deficiency. She had SPEP was negative. No albuminuria was bland urine. Vitamin D is low. We discussed all these results with her and plan as below      She is visiting us for CKD 3 and she has denied any symptoms like leg swelling, SOB after stopping HCTZ (as per our instructions last office visit due to worsening kidney function and concerns about volume depletion). She is on low salt diet and she has blood work done on this visit . She is anemic-discussed with her starting iron infusion as opposed to p.o. pills-agreed. He continues to be on losartan 100 mg daily  Her uric acid also elevated and she complained of great toe pain on and off will resume allopurinol and monitor . Her vitamin D is also low and will start on Weekly supplements and patient agreed for the plan. Her electrolytes has been normal and will give follow up in 6 months . she has been improving in her kidney function trying to reach baselin.    Her weight is stable after stopping HCTZ. No protein leak. Will start on SGLT-2 inhibitors.       Per prior notes    Last office visit September 2022. We discussed worsening kidney function. We encouraged appropriate  "hydration. We stopped metformin for lower GFR. In interim, she feels okay. Her blood pressure has been running high recently. She is on insulin. She denies increased urine output or dehydration. She keeps herself well-hydrated. No NSAID use at home. She started watching her salt. She had blood work done few days ago and all results were discussed with her in details including worsening serum creatinine 2.2 and worsening GFR 22. Within normal electrolytes. Borderline low sodium reflecting increased fluid intake. Urine analysis is bland with no blood or protein. Uric acid is normal 5.6 on allopurinol 200 mg. She does check blood pressure at home and readings are fluctuating but mostly running on the lower side. I discussed the results with her. We agreed we will follow conservative measures.     Prior notes     Kathya came alone today. She is aware of history of chronic kidney disease stage III. Recently, she reports decreased p.o. fluid intake and fresh greens in her diet. She thinks she might have been dehydrated. She uses NSAIDs occasionally for acute gout flare and joint pain. She reports low blood sugar recently and metformin was held. She noticed her blood sugar has been rising and she resumed taking metformin 1000 mg twice daily. She checks blood pressure at home and average reading 130 over 60s. No lower urinary tract symptoms. No recent contrast exposure. She had blood work done in July 2022 that showed worsening GFR from her baseline 35 down to 28. She had kidney ultrasound done that did not show hydronephrosis or postrenal obstruction.     Social history: Non-smoker, , no alcohol  Family history: Relevant      Objective   /77 (BP Location: Left arm, Patient Position: Standing, BP Cuff Size: Large adult)   Pulse 54   Temp 36.3 °C (97.3 °F)   Resp 16   Ht 1.543 m (5' 0.75\")   Wt 88.5 kg (195 lb 3.2 oz)   SpO2 94%   BMI 37.19 kg/m²   Wt Readings from Last 3 Encounters:   05/29/24 88.5 kg " (195 lb 3.2 oz)   03/21/24 84.8 kg (187 lb)   03/08/24 85.3 kg (188 lb)       Physical Exam    General appearance: no distress awake and alert on room air, euvolemic on exam  Eyes: non-icteric  HEENT: atrumatic head, PEERLA, moist mucosa  Skin: no apparent rash  Heart: NSR, S1, S2 normal, no murmur or gallop  Lungs: Symmetrical expansion,CTA bilat no wheezing/crackles  Abdomen: soft, nt/nd, obese  Extremities: no edema bilat  Neuro: No FND,asterixis, no focal deficits noticed        Review of Systems     Constitutional: no fever, no chills, no recent weight gain and no recent weight loss.   Eyes: no blurred vision and no diplopia.   ENT: no hearing loss, no earache, no sore throat, no swollen glands in the neck and no nasal discharge.   Cardiovascular: no chest pain, no palpitations and no lower extremity edema.   Respiratory: no shortness of breath, no chronic cough and no shortness of breath during exertion.   Gastrointestinal: no abdominal pain, no constipation, no heartburn, no vomiting, no bloody stools and no change in bowel movements.   Genitourinary: no dysuria and no hematuria.   Musculoskeletal: no arthralgias and no myalgias.   Skin: no rashes and no skin lesions.   Neurological: no headaches and no dizziness.   Psychiatric: no confusion, no depression and no anxiety.   Endocrine: no heat intolerance, no cold intolerance, appetite not increased, no thyroid disorder, no increased urinary frequency and no dry skin.   Hematologic/Lymphatic: does not bleed easily and does not bruise easily.   All other systems have been reviewed and are negative for complaint.         Data Review        Results from last 7 days   Lab Units 05/24/24  1111   WBC AUTO x10*3/uL 8.9   HEMOGLOBIN g/dL 12.4   HEMATOCRIT % 39.9   PLATELETS AUTO x10*3/uL 211        Lab Results   Component Value Date    URICACID 4.3 05/24/2024       Lab Results   Component Value Date    HGBA1C 7.2 (H) 02/01/2024       Results from last 7 days   Lab  "Units 05/24/24  1111   SODIUM mmol/L 139   POTASSIUM mmol/L 5.1   CHLORIDE mmol/L 105   CO2 mmol/L 24   BUN mg/dL 36*   GLUCOSE mg/dL 124*   CALCIUM mg/dL 8.8   ANION GAP mmol/L 15   EGFR mL/min/1.73m*2 28*       Albumin/Creatine Ratio   Date Value Ref Range Status   05/24/2024 63.4 (H) <30.0 ug/mg Creat Final   04/26/2023 18.2 0.0 - 30.0 ug/mg crt Final   01/23/2023 30.5 (H) 0.0 - 30.0 ug/mg crt Final       RFP  Recent Labs     05/24/24  1111 02/01/24  1348 11/17/23  1451 04/26/23  1355 01/20/23  1405 11/09/22  1200 07/07/22  1225    136 135* 134* 136 132* 136   K 5.1 5.2 5.3 5.3 4.9 5.0 5.0    102 101 100 102 99 100   CO2 24 23 23 24 26 24 26   BUN 36* 30* 31* 32* 32* 42* 28*   CREATININE 1.80* 1.95* 1.71* 1.80* 1.90* 2.22* 1.85*   GLUCOSE 124* 106* 187* 328* 128* 288* 152*   CALCIUM 8.8 9.0 9.2 9.5 9.3 9.3 9.3   PHOS 5.0*  --  4.6 4.1 4.6 4.8  --    EGFR 28* 26* 30*  --   --   --   --    ANIONGAP 15 16 16 15 13 14 15        Urineanalysis  Recent Labs     01/23/23  1011 11/09/22  1200 09/06/22  1500   COLORU YELLOW YELLOW  --    APPEARANCEU CLEAR CLEAR  --    SPECGRAVU 1.010 1.010  --    NOEL 5.5 5.0  --    PROTUR 6  NEGATIVE NEGATIVE 28*   GLUCOSEU NEGATIVE NEGATIVE  --    BLOODU NEGATIVE NEGATIVE  --    KETONESU NEGATIVE NEGATIVE  --    BILIRUBINU NEGATIVE NEGATIVE  --    NITRITEU NEGATIVE NEGATIVE  --    LEUKOCYTESU NEGATIVE NEGATIVE  --        Urine Electrolytes  Recent Labs     05/24/24  1134 04/26/23  1355 01/23/23  1011 11/09/22  1200 09/06/22  1500 07/07/22  1220   CREATU 74.8 75.3 37.4  37.4 74.4 83.3  83.3 240.0   PROTUR  --   --  6  NEGATIVE NEGATIVE 28*  --    UTPCR  --   --  0.16  --  0.34*  --    ALBUMINUR 47.4  --   --   --   --   --    MICROALBCREA 63.4* 18.2 30.5* 26.9 129.4* 36.5*        Urine Micro  No results for input(s): \"WBCU\", \"RBCU\", \"HYALCASTU\", \"SQUAMEPIU\", \"BACTERIAU\", \"MUCUSU\" in the last 77912 hours.     Iron  Recent Labs     05/24/24  1111 04/26/23  1355 01/20/23  1405 " "11/09/22  1200 07/07/22  1225 12/28/20  1017   IRON 90 68 61 66 61 66   TIBC 255 230* 268 263 295 273   IRONSAT 35 30 23* 25 21* 24*   FERRITIN 324* 727* 113 149 117 103        Current Outpatient Medications on File Prior to Visit   Medication Sig Dispense Refill    allopurinol (Zyloprim) 100 mg tablet Take 2 tablets by mouth once daily 180 tablet 0    amitriptyline (Elavil) 50 mg tablet Take 1 tablet (50 mg) by mouth once daily at bedtime. 90 tablet 1    aspirin 81 mg capsule Take 1 tablet by mouth once daily.      carvedilol (Coreg) 12.5 mg tablet Take 1 tablet by mouth twice daily 180 tablet 2    ergocalciferol (Vitamin D-2) 1.25 MG (16526 UT) capsule Take 1 capsule by mouth once a week 13 capsule 0    Farxiga 5 mg Take 2 tablets (10 mg) by mouth once daily in the morning. Take before meals.      fluticasone (Flonase) 50 mcg/actuation nasal spray Administer 2 sprays into each nostril once daily. Shake gently. Before first use, prime pump. After use, clean tip and replace cap. 16 g 2    FreeStyle Gabbi reader (FreeStyle Gabbi 2 [place]) misc Check sugar 4 x daily 1 each 0    FreeStyle Gabbi sensor system (FreeStyle Gabbi 2 Sensor) kit Check sugar 4 times daily 4 each 0    insulin aspart (NovoLOG Flexpen U-100 Insulin) 100 unit/mL (3 mL) pen Inject 12 units 2 times daily before meals 18 mL 12    losartan (Cozaar) 100 mg tablet Take 1 tablet by mouth once daily 90 tablet 2    pantoprazole (ProtoNix) 40 mg EC tablet Take 1 tablet by mouth once daily 90 tablet 2    pen needle, diabetic 31 gauge x 15/64\" needle USE 2  TWICE DAILY 200 each 2    simvastatin (Zocor) 20 mg tablet Take 1 tablet by mouth once daily 90 tablet 2    vit A/vit C/vit E/zinc/copper (VITAMINS A,C,E-ZINC-COPPER ORAL) Take 1 capsule by mouth once daily.      insulin glargine (Lantus Solostar U-100 Insulin) 100 unit/mL (3 mL) pen Inject 70 Units under the skin 2 times a day. Take as directed per insulin instructions. 144 mL 1    OneTouch Delica Plus " Lancet 33 gauge misc       OneTouch Ultra Control solution       OneTouch Ultra Test strip OneTouch Ultra In Vitro Strip   Quantity: 100  Refills: 0        Start : 26-Jul-2022   Active       No current facility-administered medications on file prior to visit.       Assessment and Plan     Kathya Ruggiero  is a 79 y.o. 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 Side, rt. Lumbar radiculopathy presents for 6 month follow up for CKD.     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 Was initially worsening but recently leveling off  - Her diabetes was not well controlled previously and discussion was done with her to control her diet  - Baseline serum creatinine 1.8-2, GFR 20-30 mils per minute 1.73 mÂ²  - Most recent serum creatinine is at 1.7 which is her baseline  - Stopping HCTZ have improved the kidney function without much effect on her volume status-she continues to be euvolemic  - Kidney ultrasound done in August 2022 was reviewed and showed increased echogenicity bilateral with no postrenal obstruction. Multiple cysts were identified. In the setting of previosuly worsening kidney functio kidney ultrasound was repeated in December 2022 - no obstruction but with increased echogenicity bilateral  - Continue conservative measures => Encouraged good hydration.   - Refilled Farxiga 10 mg every day --> If not covered by insurance will switch to Jardiance     # Chronic Anemia  - Iron deficiency anemia/chronic kidney disease - improved status post IV iron infusion in 2023.  - Within normal SPEP, folic acid, vitamin B12 levels  - Iron storage results are normal from 05/24/2024  - Hemoglobin is stable at ~ 12. Most recent H/H at 12.4/ 39.9 from 05/24/2024     # Hypertension  - Reported to be controlled per prior office visits and in this office visit. Takes  reads at home  - Current medication include carvedilol 12.5 mg twice daily, losartan 100 mg QD  - Continue to hold HCTZ  - since then no symptoms and no leg swelling   - Continue current meds    # Insulin Dependent Type 2 diabetes - stable  - Last A1c from 02/01/2024: 7.2   - Previously started on Farxiga 10 mg- to benefit for diabetes control with preserving kidney function.  - Other meds: Novolog flex pen     # Recurrent gout  - Most recent UA from 05/24/2024: 4.3 (WN)  - Refilled allopurinol 200 mg every day - continue same      # CKD/ MBD  - Ca 8.8 (WNL), Phos elevated at 5.0 from 05/24/2024  - Elevated .5 from 05/24/24, vitamin D 74 (WNL) on 05/24/2024     # CVS- high risk. On simvastatin 20 mg once daily     Follow-up in 6 months with repeat blood work and urinalysis prior to next visit.     Patient staffed with attending Dr. Hunter. Please contact via Epic chat/Haiku with any questions/concerns.     Augustina Augustin MD  Internal Medicine, PGY- 1  05/29/24 at 1:46 PM     Disclaimer: Documentation completed with the information available at the time of input. The times in the chart may not be reflective of actual patient care times, interventions, or procedures. Documentation occurs after the physical care of the patient.

## 2024-06-28 ENCOUNTER — OFFICE VISIT (OUTPATIENT)
Dept: PRIMARY CARE | Facility: CLINIC | Age: 79
End: 2024-06-28
Payer: MEDICARE

## 2024-06-28 ENCOUNTER — TELEPHONE (OUTPATIENT)
Dept: PRIMARY CARE | Facility: CLINIC | Age: 79
End: 2024-06-28
Payer: MEDICARE

## 2024-06-28 VITALS
HEIGHT: 60 IN | BODY MASS INDEX: 38.87 KG/M2 | OXYGEN SATURATION: 97 % | WEIGHT: 198 LBS | DIASTOLIC BLOOD PRESSURE: 78 MMHG | SYSTOLIC BLOOD PRESSURE: 144 MMHG | HEART RATE: 53 BPM

## 2024-06-28 DIAGNOSIS — M94.0 COSTOCHONDRITIS: Primary | ICD-10-CM

## 2024-06-28 PROCEDURE — 1036F TOBACCO NON-USER: CPT | Performed by: INTERNAL MEDICINE

## 2024-06-28 PROCEDURE — 3077F SYST BP >= 140 MM HG: CPT | Performed by: INTERNAL MEDICINE

## 2024-06-28 PROCEDURE — 1159F MED LIST DOCD IN RCRD: CPT | Performed by: INTERNAL MEDICINE

## 2024-06-28 PROCEDURE — 3078F DIAST BP <80 MM HG: CPT | Performed by: INTERNAL MEDICINE

## 2024-06-28 PROCEDURE — 1124F ACP DISCUSS-NO DSCNMKR DOCD: CPT | Performed by: INTERNAL MEDICINE

## 2024-06-28 PROCEDURE — 99213 OFFICE O/P EST LOW 20 MIN: CPT | Performed by: INTERNAL MEDICINE

## 2024-06-28 PROCEDURE — 1160F RVW MEDS BY RX/DR IN RCRD: CPT | Performed by: INTERNAL MEDICINE

## 2024-06-28 RX ORDER — PREDNISONE 20 MG/1
20 TABLET ORAL DAILY
Qty: 5 TABLET | Refills: 0 | Status: SHIPPED | OUTPATIENT
Start: 2024-06-28 | End: 2024-07-03

## 2024-06-28 ASSESSMENT — PATIENT HEALTH QUESTIONNAIRE - PHQ9
2. FEELING DOWN, DEPRESSED OR HOPELESS: NOT AT ALL
1. LITTLE INTEREST OR PLEASURE IN DOING THINGS: NOT AT ALL
SUM OF ALL RESPONSES TO PHQ9 QUESTIONS 1 AND 2: 0

## 2024-06-28 NOTE — PATIENT INSTRUCTIONS
Take prednisone 20mg daily. Increase insulin to 75 units twice daily while on pred then back to 70 units daily

## 2024-06-28 NOTE — PROGRESS NOTES
Subjective   Patient ID: Kathya Ruggiero is a 79 y.o. female who presents for muscle pain in chest.    HPI     Reports having a coughing fit and felt pain under her left breast. Pain is now constant but hurts when she sits or moves certain ways. No rash. No CP or SOB     Review of Systems   All other systems reviewed and are negative.      Objective   /78   Pulse 53   Ht 1.524 m (5')   Wt 89.8 kg (198 lb)   SpO2 97%   BMI 38.67 kg/m²     Physical Exam  Constitutional:       Appearance: Normal appearance.   HENT:      Head: Normocephalic and atraumatic.   Cardiovascular:      Rate and Rhythm: Regular rhythm.      Heart sounds: Normal heart sounds. No murmur heard.     No gallop.   Pulmonary:      Effort: Pulmonary effort is normal. No respiratory distress.      Breath sounds: No wheezing or rales.   Musculoskeletal:      Comments: Pain with palpation under on anterior inferior ribs , no rash    Skin:     General: Skin is warm and dry.      Findings: No rash.   Neurological:      Mental Status: She is alert and oriented to person, place, and time. Mental status is at baseline.   Psychiatric:         Mood and Affect: Mood normal.         Behavior: Behavior normal.         Assessment/Plan       #Costochondritis   -Rx prednisone 20mg daily. Increase Lantus to 75 units Bid while on pred

## 2024-07-04 DIAGNOSIS — E55.9 VITAMIN D DEFICIENCY: ICD-10-CM

## 2024-07-05 RX ORDER — ERGOCALCIFEROL 1.25 MG/1
1 CAPSULE ORAL
Qty: 13 CAPSULE | Refills: 0 | Status: SHIPPED | OUTPATIENT
Start: 2024-07-07

## 2024-07-15 ENCOUNTER — TELEMEDICINE (OUTPATIENT)
Dept: PRIMARY CARE | Facility: CLINIC | Age: 79
End: 2024-07-15
Payer: MEDICARE

## 2024-07-15 DIAGNOSIS — U07.1 COVID: Primary | ICD-10-CM

## 2024-07-15 PROCEDURE — 1159F MED LIST DOCD IN RCRD: CPT | Performed by: FAMILY MEDICINE

## 2024-07-15 PROCEDURE — 1160F RVW MEDS BY RX/DR IN RCRD: CPT | Performed by: FAMILY MEDICINE

## 2024-07-15 PROCEDURE — 99213 OFFICE O/P EST LOW 20 MIN: CPT | Performed by: FAMILY MEDICINE

## 2024-07-15 RX ORDER — CYCLOBENZAPRINE HCL 10 MG
10 TABLET ORAL NIGHTLY
COMMUNITY
Start: 2024-07-11

## 2024-07-15 RX ORDER — HYDROCODONE BITARTRATE AND ACETAMINOPHEN 5; 325 MG/1; MG/1
1 TABLET ORAL EVERY 6 HOURS PRN
COMMUNITY
Start: 2024-07-12

## 2024-07-15 NOTE — PROGRESS NOTES
I performed this visit using realtime telehealth tools, including an audio/video OR telephone connection between the patient listed who was located in the [facility] and myself, Alicia Hood (Board certified in the [facility]).  At the start of the visit, I introduced myself as Dr. Black and verified the patients name, , and current physical location.    If they were currently outside of the state of [place], the visit was ended and the patient was referred to alternative means for evaluation and treatment.   The patient was made aware of the limitations of the telehealth visit.  They will not be physically examined and all issues may not be appropriate for a telehealth visit.  If necessary, an in person referral will be made.      DISCLAIMER:   In preparing for this visit and writing this note, I reviewed previous electronic medical records (labs, imaging and medical charts) of the patient available in the physician portal. Significant findings which helped in decision making are recorded in this encounter charting.    All allergies were reviewed with the patient and all medications reconciled with the patient.    Daughter with COVID--works in home [employer] and she was exposed  Her sxs started yesterday  Started with fatigue  Then cough and PND  No RN or stuffy nose  Occasional cough  No SOB or wheeze  or CP  No chills, myalgias  +HA   Sl subjective fever yesterday--   No ST  Good fluids and appetite  Sleep--up every hour for PND and cough  Glands in neck feel like she is going to cough    COVID hx-- never  VAXXED-- yes-- 2 shots--    Sinusitis  Antibiotic as written    COVID +--   Paxlovid discussed--how to take and how it works and adverse effects--paxlovid prescribed? Yes  STOP ZOCOR--  Do not take norco with paxlovid    Addendum-- paxlovid discontinued as pt has low eGFR--script was not available on the  from her pharmacy

## 2024-07-16 ENCOUNTER — TELEMEDICINE (OUTPATIENT)
Dept: PRIMARY CARE | Facility: CLINIC | Age: 79
End: 2024-07-16
Payer: MEDICARE

## 2024-07-16 DIAGNOSIS — U07.1 COVID: Primary | ICD-10-CM

## 2024-07-16 PROCEDURE — 99202 OFFICE O/P NEW SF 15 MIN: CPT | Performed by: NURSE PRACTITIONER

## 2024-07-16 ASSESSMENT — ENCOUNTER SYMPTOMS
CHILLS: 0
SORE THROAT: 0
NAUSEA: 0
COUGH: 1
HEADACHES: 1
SWOLLEN GLANDS: 0
FLU SYMPTOMS: 1
FEVER: 0
FATIGUE: 1

## 2024-07-16 NOTE — PATIENT INSTRUCTIONS
A prescription was sent to your pharmacy. Your pharmacist can answer any questions or concerns you may have or you may call the office.    Recommend symptom management    Treatment  Tylenol for aches and pains, fever  Warm salt water gargles for throat discomfort  Lots of Fluids such as tea with honey, soup, broth, water, Electrolyte replacement drinks  Rest    Quarantine for 5 days, mask if in public days 6-10 or if with symptoms    Preventing Infection    Wash your hands. Wash your hands thoroughly and often with soap and water for at least 20 seconds. If soap and water aren't available, use an alcohol-based hand  that contains at least 60% alcohol. Teach your children the importance of hand-washing. Avoid touching your eyes, nose or mouth with unwashed hands.    Disinfect your stuff. Clean and disinfect high-touch surfaces, such as doorknobs, light switches, electronics, and kitchen and bathroom countertops daily. This is especially important when someone in your family has a cold. Wash children's toys periodically.    Cover your cough. Sneeze and cough into tissues. Throw away used tissues right away, then wash your hands thoroughly. If you don't have a tissue, sneeze or cough into the bend of your elbow and then wash your hands.    Don't share. Don't share drinking glasses or eating utensils with other family members. Use your own glass or disposable cups when you or someone else is sick. Label the cup or glass with the name of the person using it.    Stay away from people with colds. Avoid close contact with anyone who has a cold. Stay out of crowds, when possible. Avoid touching your eyes, nose and mouth.  Review your  center's policies. Look for a  setting with good hygiene practices and clear policies about keeping sick children at home.    Take care of yourself. Eating well and getting exercise and enough sleep is good for your overall health.

## 2024-07-16 NOTE — PROGRESS NOTES
Subjective   Patient ID: Kathya Ruggiero is a 79 y.o. female who presents for a Virtual Visit Flu Symptoms.    Flu Symptoms  This is a new problem. The current episode started in the past 7 days. The problem occurs intermittently. The problem has been waxing and waning. Associated symptoms include coughing, fatigue and headaches. Pertinent negatives include no chest pain, chills, fever, nausea, sore throat or swollen glands. She has tried nothing for the symptoms.   Tested positive for Covid yesterday, symptoms started 3 days ago, mild, has not taken anything. States had a virtual visit yesterday but scrip[t was not available at her pharmacy    Endorses CKD and Diabetes. Discussed precautions with CKD, given mild symptoms recommended she wait to start Paxlovid, has to Thursday to start(day 5 of symptom onset)    Review of Systems   Constitutional:  Positive for fatigue. Negative for chills and fever.   HENT:  Negative for sore throat.    Respiratory:  Positive for cough.    Cardiovascular:  Negative for chest pain.   Gastrointestinal:  Negative for nausea.   Neurological:  Positive for headaches.       Physical Exam not performed  E-visit questionnaire reviewed and discussed with patient.   All questions answered    Assessment/Plan   Problem List Items Addressed This Visit    None  Visit Diagnoses         Codes    COVID    -  Primary U07.1    Relevant Medications    nirmatrelvir-ritonavir (PAXLOVID) 150-100 mg tablet therapy pack        Discussed waiting to start medications in light of mild symptoms  Handouts provided vis My Chart  Discussed with patient   Verbalized understanding, Teach back utilized  Recommend follow up with PCP

## 2024-07-19 NOTE — PATIENT INSTRUCTIONS
COVID +    Please send me a [employer] message if you have any questions or concerns.  FOR NON URGENT questions only.  Allow up to 72 hours for response.    If you have prescription issues or other questions you can email   Jaron Street  Digital Health Coordinator, at   [email or web address]     Rest and drink plenty of fluids    Tylenol and or motrin as needed for pain and fever (unless you have been told not to take these because of your personal medical history)    Discussed options and precautions (complaint specific and may include)  Viral versus bacterial infection; use of medications; possible side effects; appropriate over-the-counter medications; possible complications and /or when to follow-up.    Follow-up in 1 to 2 days if not improving.  Follow-up immediately if symptoms worsen.    All red flags requiring in person care were discussed.  All patient's questions were answered.    To connect with a new PCP please visit [email or web address] or call [phone]     If experiencing any severe or worsening symptoms including but not limited to lethargy / chest pain / weakness / dizziness / difficulty breathing please call 911 or go to the emergency department for immediate care!    Limitations to telemedicine include inability to do a complete and accurate physical exam.  Any concerns regarding this were conveyed with the patient and in person follow-up recommended if patient nature of illness does not progress as anticipated during this visit.    CDC updated Respiratory Infection guidelines:   When you have a respiratory virus, stay home and away from others (including people  you live with who are not sick) Symptoms can include fever, chills, fatigue, cough,  runny nose, and headache (and others).    You can go back to your normal activities when,   for at least 24 hours,   BOTH are true:    1) Your symptoms are getting better overall  2) You have not had a fever  (and are not using fever-reducing medication).    When you go back to your normal activities, take added precaution over the next 5 days, such as taking additional steps for  air, hygiene, masks, physical distancing, and/or testing when you will be around other people indoors.    Keep in mind that you may still be able to spread the virus that made you sick, even if you are feeling better. You are likely to be less contagious at this time, depending on factors like how long you were sick or how sick you were.    If you develop a fever or you start to feel worse after you have gone back to normal activities, stay home and away from others again until, for at least 24 hours, both are true: your symptoms are improving overall, and you have not had a fever (and are not using fever-reducing medication). Then take added precaution for the next 5 days.    If you never had symptoms but tested positive for a respiratory virus?, you may be contagious. For the next 5 days: take added precaution, such as taking additional steps for  air, hygiene, masks, physical distancing, and/or testing when you will be around other people indoors. This is especially important to protect people with factors that increase their risk of severe illness from respiratory viruses.  Avoid immunocompromised, elderly, pregnant women, infants etc    Have a low threshold for in person evaluation if your symptoms worsen.      Over-the-counter cold and cough medications    Take Mucinex for cough, drink plenty of fluids with this medication and will help break up congestion making it easier to cough up    For cough can use honey (children ages 1 and up) in hot tea or hot water. I recommend putting this in an insulated cup and carrying it around throughout the day to sip on.  Have it at your bedside at night in case you wake up coughing.  You can also use honey cough drops (adults and older children).    Recommend nasal saline for use in  children and adults.  Neti Hernández can also be helpful.  (Never used tap water and a Neti pot.  Use distilled water.)    If you have plugged up congested ears or the feeling of fluid in your ears, you can use an over-the-counter nasal steroid spray like fluticasone (brand name Flonase) use 2 sprays into each nostril once or twice a day for 7 days.  This will help open up the eustachian tubes and allow the fluid to drain out of your ears.    Sleeping with your head/chest elevated can help with sinus drainage.    Adults only-can use nasal decongestant (like Afrin) at bedtime to open nasal passages so you can breathe through your nose while you sleep; avoid using for longer than 3 days as this medication can become addicting.  Do not use if you have high blood pressure or high heart rate.    For severe pain or fever in adult-Tylenol (2 extra strength) or ibuprofen (3-4 tabs equals 600 to 800 mg) alternating as needed for pain.  Tylenol doses should be 6 to 8 hours apart and ibuprofen doses should be 6 to 8 hours apart.      Common cold medications for adults explained:    **Cold medications for children are not recommended-only Tylenol, Motrin, honey (only for children older than 1 year), cool-mist humidifier, and nasal saline spray are recommended for children.    Mucinex-(generic name guaifenesin)-is an expectorant.  This will thin out all the thick mucus.  Must drink plenty of liquids for this medicine to work.    Dextromethorphan (brand name Delsym or DM)-this medicine is a cough suppressant--caution/avoid use if taking SSRI medication because of risk of Serotonin Syndrome    Honey in hot water or tea-this is a natural cough suppressant    Decongestant nasal spray- (eg: Afrin) use for temporary relief of nasal congestion-best when used at bedtime to open up nasal passages so that you are not forced to mouth breathe overnight.    Sudafed (generic name pseudoephedrine-this must be bought from the pharmacist) DO NOT use  this medicine if you have high blood pressure as it can raise your blood pressure higher.  Do not use if you have any irregular heart rate.  This medicine can help clear congestion in your sinuses.

## 2024-08-26 ENCOUNTER — TELEPHONE (OUTPATIENT)
Dept: PRIMARY CARE | Facility: CLINIC | Age: 79
End: 2024-08-26
Payer: MEDICARE

## 2024-08-26 DIAGNOSIS — E11.22 TYPE 2 DIABETES MELLITUS WITH STAGE 4 CHRONIC KIDNEY DISEASE, WITH LONG-TERM CURRENT USE OF INSULIN (MULTI): Primary | ICD-10-CM

## 2024-08-26 DIAGNOSIS — Z79.4 TYPE 2 DIABETES MELLITUS WITH STAGE 4 CHRONIC KIDNEY DISEASE, WITH LONG-TERM CURRENT USE OF INSULIN (MULTI): Primary | ICD-10-CM

## 2024-08-26 DIAGNOSIS — N18.4 TYPE 2 DIABETES MELLITUS WITH STAGE 4 CHRONIC KIDNEY DISEASE, WITH LONG-TERM CURRENT USE OF INSULIN (MULTI): Primary | ICD-10-CM

## 2024-08-26 NOTE — TELEPHONE ENCOUNTER
Kathya called and was wondering if she needed to get blood work for her A1C and other blood work. If so can you please put order in so she can have that done for her appt that is on 9/9/2024

## 2024-08-30 ENCOUNTER — LAB (OUTPATIENT)
Dept: LAB | Facility: LAB | Age: 79
End: 2024-08-30
Payer: MEDICARE

## 2024-08-30 DIAGNOSIS — N18.4 TYPE 2 DIABETES MELLITUS WITH STAGE 4 CHRONIC KIDNEY DISEASE, WITH LONG-TERM CURRENT USE OF INSULIN (MULTI): ICD-10-CM

## 2024-08-30 DIAGNOSIS — E11.22 TYPE 2 DIABETES MELLITUS WITH STAGE 4 CHRONIC KIDNEY DISEASE, WITH LONG-TERM CURRENT USE OF INSULIN (MULTI): ICD-10-CM

## 2024-08-30 DIAGNOSIS — Z79.4 TYPE 2 DIABETES MELLITUS WITH STAGE 4 CHRONIC KIDNEY DISEASE, WITH LONG-TERM CURRENT USE OF INSULIN (MULTI): ICD-10-CM

## 2024-08-30 LAB
EST. AVERAGE GLUCOSE BLD GHB EST-MCNC: 134 MG/DL
HBA1C MFR BLD: 6.3 %

## 2024-08-30 PROCEDURE — 36415 COLL VENOUS BLD VENIPUNCTURE: CPT

## 2024-08-30 PROCEDURE — 83036 HEMOGLOBIN GLYCOSYLATED A1C: CPT

## 2024-09-09 ENCOUNTER — APPOINTMENT (OUTPATIENT)
Dept: PRIMARY CARE | Facility: CLINIC | Age: 79
End: 2024-09-09
Payer: MEDICARE

## 2024-09-09 VITALS
HEIGHT: 60 IN | WEIGHT: 194 LBS | BODY MASS INDEX: 38.09 KG/M2 | HEART RATE: 69 BPM | DIASTOLIC BLOOD PRESSURE: 80 MMHG | SYSTOLIC BLOOD PRESSURE: 143 MMHG | OXYGEN SATURATION: 92 %

## 2024-09-09 DIAGNOSIS — N18.4 TYPE 2 DIABETES MELLITUS WITH STAGE 4 CHRONIC KIDNEY DISEASE, WITH LONG-TERM CURRENT USE OF INSULIN (MULTI): Primary | ICD-10-CM

## 2024-09-09 DIAGNOSIS — E11.22 TYPE 2 DIABETES MELLITUS WITH STAGE 4 CHRONIC KIDNEY DISEASE, WITH LONG-TERM CURRENT USE OF INSULIN (MULTI): Primary | ICD-10-CM

## 2024-09-09 DIAGNOSIS — Z79.4 TYPE 2 DIABETES MELLITUS WITH STAGE 4 CHRONIC KIDNEY DISEASE, WITH LONG-TERM CURRENT USE OF INSULIN (MULTI): Primary | ICD-10-CM

## 2024-09-09 DIAGNOSIS — E11.9 DIABETES MELLITUS WITHOUT COMPLICATION (MULTI): ICD-10-CM

## 2024-09-09 PROCEDURE — 1160F RVW MEDS BY RX/DR IN RCRD: CPT | Performed by: INTERNAL MEDICINE

## 2024-09-09 PROCEDURE — 1036F TOBACCO NON-USER: CPT | Performed by: INTERNAL MEDICINE

## 2024-09-09 PROCEDURE — 1159F MED LIST DOCD IN RCRD: CPT | Performed by: INTERNAL MEDICINE

## 2024-09-09 PROCEDURE — 3077F SYST BP >= 140 MM HG: CPT | Performed by: INTERNAL MEDICINE

## 2024-09-09 PROCEDURE — 99213 OFFICE O/P EST LOW 20 MIN: CPT | Performed by: INTERNAL MEDICINE

## 2024-09-09 PROCEDURE — 3079F DIAST BP 80-89 MM HG: CPT | Performed by: INTERNAL MEDICINE

## 2024-09-09 RX ORDER — INSULIN GLARGINE 100 [IU]/ML
60 INJECTION, SOLUTION SUBCUTANEOUS 2 TIMES DAILY
Qty: 144 ML | Refills: 1 | Status: SHIPPED | OUTPATIENT
Start: 2024-09-09 | End: 2024-12-08

## 2024-09-09 NOTE — PROGRESS NOTES
Subjective   Patient ID: Kathya Ruggiero is a 79 y.o. female who presents for Follow-up (6 month follow up).    HPI     Overall doing well  No specific concerns   No CP, SOB, FISCHER or LE edema   Need to review labs that were recently completed     Review of Systems   All other systems reviewed and are negative.      Objective   /80   Pulse 69   Ht 1.524 m (5')   Wt 88 kg (194 lb)   SpO2 92%   BMI 37.89 kg/m²     Physical Exam  Constitutional:       Appearance: Normal appearance.   HENT:      Head: Normocephalic and atraumatic.   Cardiovascular:      Rate and Rhythm: Normal rate and regular rhythm.      Heart sounds: Normal heart sounds. No murmur heard.     No gallop.   Pulmonary:      Effort: Pulmonary effort is normal. No respiratory distress.      Breath sounds: No wheezing or rales.   Skin:     General: Skin is warm and dry.      Findings: No rash.   Neurological:      Mental Status: She is alert and oriented to person, place, and time. Mental status is at baseline.   Psychiatric:         Mood and Affect: Mood normal.         Behavior: Behavior normal.         Assessment/Plan        #DM2  -A1C 6.3%   -Cont Farxiga 10mg daily  -Decrease Lantus to 60 units BID (was taking 70 units BID)  -Cont Novolog 12U AC      #HTN  -BP is good at home. Cont losartan 100mg daily and carvedilol 12.5mg BID      #HLD  -Cont simvastatin 20mg     #Insomia  -Continue amitriptyline 50mg nightly      #CKD4  -Following with Dr. Hunter.      #Gout   -Cont allopurinol 100mg        Influenza: 10/16/23   COVID: x3   Prevnar 13: UTD   Pneumovax 23: UTD   Prevnar 20: 4/2024  RSV: x1  Shingrix: x2  Mamm: 6/29/21  DEXA: 6/10/2020--normal   Pap: NI   Colorectal ca: 9/12/13  Lung ca screening: NI     RTC 6 mo

## 2024-09-09 NOTE — PATIENT INSTRUCTIONS
Decrease Lantus to 60mg twice daily    Flu and COVID vaccines in October    Come back in 3 months

## 2024-09-30 DIAGNOSIS — E55.9 VITAMIN D DEFICIENCY: ICD-10-CM

## 2024-10-02 RX ORDER — ERGOCALCIFEROL 1.25 MG/1
1 CAPSULE ORAL
Qty: 13 CAPSULE | Refills: 0 | Status: SHIPPED | OUTPATIENT
Start: 2024-10-06

## 2024-11-05 DIAGNOSIS — K21.9 CHRONIC GASTROESOPHAGEAL REFLUX DISEASE: ICD-10-CM

## 2024-11-07 RX ORDER — PANTOPRAZOLE SODIUM 40 MG/1
40 TABLET, DELAYED RELEASE ORAL DAILY
Qty: 90 TABLET | Refills: 1 | Status: SHIPPED | OUTPATIENT
Start: 2024-11-07

## 2024-11-20 DIAGNOSIS — I10 HYPERTENSION, UNSPECIFIED TYPE: ICD-10-CM

## 2024-11-21 RX ORDER — CARVEDILOL 12.5 MG/1
12.5 TABLET ORAL 2 TIMES DAILY
Qty: 180 TABLET | Refills: 1 | Status: SHIPPED | OUTPATIENT
Start: 2024-11-21

## 2024-11-25 DIAGNOSIS — E11.22 TYPE 2 DIABETES MELLITUS WITH STAGE 4 CHRONIC KIDNEY DISEASE, WITH LONG-TERM CURRENT USE OF INSULIN (MULTI): ICD-10-CM

## 2024-11-25 DIAGNOSIS — N18.4 TYPE 2 DIABETES MELLITUS WITH STAGE 4 CHRONIC KIDNEY DISEASE, WITH LONG-TERM CURRENT USE OF INSULIN (MULTI): ICD-10-CM

## 2024-11-25 DIAGNOSIS — Z79.4 TYPE 2 DIABETES MELLITUS WITH STAGE 4 CHRONIC KIDNEY DISEASE, WITH LONG-TERM CURRENT USE OF INSULIN (MULTI): ICD-10-CM

## 2024-11-27 ENCOUNTER — LAB (OUTPATIENT)
Dept: LAB | Facility: LAB | Age: 79
End: 2024-11-27
Payer: MEDICARE

## 2024-11-27 DIAGNOSIS — E55.9 VITAMIN D DEFICIENCY: ICD-10-CM

## 2024-11-27 DIAGNOSIS — N18.4 TYPE 2 DIABETES MELLITUS WITH STAGE 4 CHRONIC KIDNEY DISEASE, WITH LONG-TERM CURRENT USE OF INSULIN (MULTI): ICD-10-CM

## 2024-11-27 DIAGNOSIS — E66.812 CLASS 2 SEVERE OBESITY WITH BODY MASS INDEX (BMI) OF 35 TO 39.9 WITH SERIOUS COMORBIDITY: ICD-10-CM

## 2024-11-27 DIAGNOSIS — I10 PRIMARY HYPERTENSION: ICD-10-CM

## 2024-11-27 DIAGNOSIS — D50.8 OTHER IRON DEFICIENCY ANEMIA: ICD-10-CM

## 2024-11-27 DIAGNOSIS — Z79.4 TYPE 2 DIABETES MELLITUS WITH STAGE 4 CHRONIC KIDNEY DISEASE, WITH LONG-TERM CURRENT USE OF INSULIN (MULTI): ICD-10-CM

## 2024-11-27 DIAGNOSIS — E66.01 CLASS 2 SEVERE OBESITY WITH BODY MASS INDEX (BMI) OF 35 TO 39.9 WITH SERIOUS COMORBIDITY: ICD-10-CM

## 2024-11-27 DIAGNOSIS — E11.22 TYPE 2 DIABETES MELLITUS WITH STAGE 4 CHRONIC KIDNEY DISEASE, WITH LONG-TERM CURRENT USE OF INSULIN (MULTI): ICD-10-CM

## 2024-11-27 DIAGNOSIS — N18.32 STAGE 3B CHRONIC KIDNEY DISEASE (MULTI): ICD-10-CM

## 2024-11-27 DIAGNOSIS — M10.00 ACUTE IDIOPATHIC GOUT, UNSPECIFIED SITE: ICD-10-CM

## 2024-11-27 LAB
25(OH)D3 SERPL-MCNC: 92 NG/ML (ref 30–100)
ALBUMIN SERPL BCP-MCNC: 4.2 G/DL (ref 3.4–5)
ANION GAP SERPL CALC-SCNC: 15 MMOL/L (ref 10–20)
BUN SERPL-MCNC: 37 MG/DL (ref 6–23)
CALCIUM SERPL-MCNC: 9.1 MG/DL (ref 8.6–10.3)
CHLORIDE SERPL-SCNC: 103 MMOL/L (ref 98–107)
CHOLEST SERPL-MCNC: 152 MG/DL (ref 0–199)
CHOLESTEROL/HDL RATIO: 3.6
CO2 SERPL-SCNC: 26 MMOL/L (ref 21–32)
CREAT SERPL-MCNC: 1.63 MG/DL (ref 0.5–1.05)
CREAT UR-MCNC: 59.3 MG/DL (ref 20–320)
EGFRCR SERPLBLD CKD-EPI 2021: 32 ML/MIN/1.73M*2
ERYTHROCYTE [DISTWIDTH] IN BLOOD BY AUTOMATED COUNT: 14.6 % (ref 11.5–14.5)
EST. AVERAGE GLUCOSE BLD GHB EST-MCNC: 137 MG/DL
FERRITIN SERPL-MCNC: 312 NG/ML (ref 8–150)
GLUCOSE SERPL-MCNC: 76 MG/DL (ref 74–99)
HBA1C MFR BLD: 6.4 %
HCT VFR BLD AUTO: 41.5 % (ref 36–46)
HDLC SERPL-MCNC: 42.8 MG/DL
HGB BLD-MCNC: 12.9 G/DL (ref 12–16)
IRON SATN MFR SERPL: 26 % (ref 25–45)
IRON SERPL-MCNC: 68 UG/DL (ref 35–150)
LDLC SERPL CALC-MCNC: 72 MG/DL
MCH RBC QN AUTO: 29 PG (ref 26–34)
MCHC RBC AUTO-ENTMCNC: 31.1 G/DL (ref 32–36)
MCV RBC AUTO: 93 FL (ref 80–100)
MICROALBUMIN UR-MCNC: 13.6 MG/L
MICROALBUMIN/CREAT UR: 22.9 UG/MG CREAT
NON HDL CHOLESTEROL: 109 MG/DL (ref 0–149)
NRBC BLD-RTO: 0 /100 WBCS (ref 0–0)
PHOSPHATE SERPL-MCNC: 4.7 MG/DL (ref 2.5–4.9)
PLATELET # BLD AUTO: 235 X10*3/UL (ref 150–450)
POTASSIUM SERPL-SCNC: 5 MMOL/L (ref 3.5–5.3)
PTH-INTACT SERPL-MCNC: 128.1 PG/ML (ref 18.5–88)
RBC # BLD AUTO: 4.45 X10*6/UL (ref 4–5.2)
SODIUM SERPL-SCNC: 139 MMOL/L (ref 136–145)
TIBC SERPL-MCNC: 261 UG/DL (ref 240–445)
TRIGL SERPL-MCNC: 187 MG/DL (ref 0–149)
UIBC SERPL-MCNC: 193 UG/DL (ref 110–370)
URATE SERPL-MCNC: 4.3 MG/DL (ref 2.3–6.7)
VLDL: 37 MG/DL (ref 0–40)
WBC # BLD AUTO: 9.2 X10*3/UL (ref 4.4–11.3)

## 2024-11-27 PROCEDURE — 82728 ASSAY OF FERRITIN: CPT

## 2024-11-27 PROCEDURE — 80069 RENAL FUNCTION PANEL: CPT

## 2024-11-27 PROCEDURE — 82306 VITAMIN D 25 HYDROXY: CPT

## 2024-11-27 PROCEDURE — 36415 COLL VENOUS BLD VENIPUNCTURE: CPT

## 2024-11-27 PROCEDURE — 83036 HEMOGLOBIN GLYCOSYLATED A1C: CPT

## 2024-11-27 PROCEDURE — 82570 ASSAY OF URINE CREATININE: CPT

## 2024-11-27 PROCEDURE — 83970 ASSAY OF PARATHORMONE: CPT

## 2024-11-27 PROCEDURE — 84550 ASSAY OF BLOOD/URIC ACID: CPT

## 2024-11-27 PROCEDURE — 83550 IRON BINDING TEST: CPT

## 2024-11-27 PROCEDURE — 85027 COMPLETE CBC AUTOMATED: CPT

## 2024-11-27 PROCEDURE — 82043 UR ALBUMIN QUANTITATIVE: CPT

## 2024-11-27 PROCEDURE — 80061 LIPID PANEL: CPT

## 2024-11-27 PROCEDURE — 83540 ASSAY OF IRON: CPT

## 2024-12-02 ENCOUNTER — APPOINTMENT (OUTPATIENT)
Dept: NEPHROLOGY | Facility: CLINIC | Age: 79
End: 2024-12-02
Payer: MEDICARE

## 2024-12-02 VITALS
SYSTOLIC BLOOD PRESSURE: 113 MMHG | WEIGHT: 191.4 LBS | BODY MASS INDEX: 36.14 KG/M2 | TEMPERATURE: 97.3 F | DIASTOLIC BLOOD PRESSURE: 64 MMHG | HEIGHT: 61 IN | HEART RATE: 57 BPM | OXYGEN SATURATION: 95 %

## 2024-12-02 DIAGNOSIS — E66.01 CLASS 2 SEVERE OBESITY WITH BODY MASS INDEX (BMI) OF 35 TO 39.9 WITH SERIOUS COMORBIDITY: ICD-10-CM

## 2024-12-02 DIAGNOSIS — M10.00 ACUTE IDIOPATHIC GOUT, UNSPECIFIED SITE: ICD-10-CM

## 2024-12-02 DIAGNOSIS — E66.812 CLASS 2 SEVERE OBESITY WITH BODY MASS INDEX (BMI) OF 35 TO 39.9 WITH SERIOUS COMORBIDITY: ICD-10-CM

## 2024-12-02 DIAGNOSIS — D50.8 OTHER IRON DEFICIENCY ANEMIA: ICD-10-CM

## 2024-12-02 DIAGNOSIS — N18.32 STAGE 3B CHRONIC KIDNEY DISEASE (MULTI): Primary | ICD-10-CM

## 2024-12-02 DIAGNOSIS — I10 PRIMARY HYPERTENSION: ICD-10-CM

## 2024-12-02 DIAGNOSIS — E55.9 VITAMIN D DEFICIENCY: ICD-10-CM

## 2024-12-02 PROCEDURE — G2211 COMPLEX E/M VISIT ADD ON: HCPCS | Performed by: INTERNAL MEDICINE

## 2024-12-02 PROCEDURE — 1159F MED LIST DOCD IN RCRD: CPT | Performed by: INTERNAL MEDICINE

## 2024-12-02 PROCEDURE — 3078F DIAST BP <80 MM HG: CPT | Performed by: INTERNAL MEDICINE

## 2024-12-02 PROCEDURE — 3074F SYST BP LT 130 MM HG: CPT | Performed by: INTERNAL MEDICINE

## 2024-12-02 PROCEDURE — 99214 OFFICE O/P EST MOD 30 MIN: CPT | Performed by: INTERNAL MEDICINE

## 2024-12-02 NOTE — PATIENT INSTRUCTIONS
Dear JUAN   It was nice seeing you in the nephrology clinic today     Today we discussed the following:     #Chronic kidney disease stage 3/4 most likely diabetes and hypertension effect. Your kidney function improved from baseline 25-30% up to 32%-good job (this is stage III now). Continue to hold water pills. I want you to continue losartan and Keep yourself well-hydrated     #Uric acid is elevated-now normal. Continue allopurinol     #Hypertension-blood pressure is accepted at home-continue losartan 100 mg. Do not take hydrochlorothiazide/water pill        #Diabetes-continue to hold metformin. Continue Farxiga 10 mg- this class of medication might increase risk of urinary tract infection. Keep the genital area clean and dry. Don't hold urine (counseled)     #Anemia with low iron-improved significantly after IV iron infusion     #Low vitamin D-now normal. Continue vitamin D supplements weekly        Follow-up in 6 months with repeat blood work and urinalysis prior to next visit.     Please call our office if you have any question  Thank you for coming to see me today     Erasmo Hunter MD, MS, JELLY ALVARADO  Clinical  - [facility] [employer] [facility] [employer]  Nephrologist - [facility]

## 2024-12-02 NOTE — PROGRESS NOTES
Subjective     Kathya Ruggiero is a 79 y.o. 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 Side, rt. Lumbar radiculopathy presents for 6 month follow up for CKD.    Last office visit May 2024.  Payal came today with her  (happened to be my patient to).  No kidney related complaints or concerns.  She follows instruction.  She limited her dairy intake.  Repeat blood work showed improved serum creatinine 1.6 from prior 1.8, GFR improved 32 from prior 25.  Phosphorus is now normal 4.7 from prior 5.  Blood pressure is in target.  No weight gain.  She continues to be off hydrochlorothiazide.  Uric acid is normal-continue to be on allopurinol-she denies gout flare.  Overall Kathya is stable and she is doing very well.  Will see her again in 6 months    Prior notes    Seen and examined today in office. Patient had no active or acute complaints. We discussed her kidney function is improving. Her protein leak is better. She had hyperphosphatemia which is likely from increased ice cream intake. We discussed limiting ice cream intake. Overall, doing well. Patient also brought home BP reads. States her BP is well at home.     Per prior Note:    Last office visit May 2023.  No events in the interim.  Kathya came alone today.  No kidney related complaints or concerns.  She is adherent with medication and low-salt diet.  She started using continuous glucose monitor which is helping her to take care of her diabetes.  Blood pressure at home is reported to be 130-140/70.  Blood pressure elevated in office today-asymptomatic.  She had blood work done on 17 November-all results were discussed with her in details including stable serum creatinine 1.7 and GFR 30.  With normal lecture lites.  Within normal vitamin D and uric acid.  No significant anemia.  She is excited to know that she is doing better from the kidney standpoint      Prior notes    Last office visit January 2023. In interim no events. She came alone today. She reports being in her baseline state of health. Blood pressure is accepted. Restarted Farxiga last office visit-blood sugar has been running 130-140. She had blood work done recently and results were discussed with her in details including stable serum creatinine 1.8 and GFR 28 with a normal electrolytes. She underwent a course of IV iron infusion-significantly improved anemia 12.8 and replete iron storage. Last office visit we started weekly vitamin D for vitamin D deficiency-Vitamin D is now normal. Uric acid was elevated-now normal after starting allopurinol. Overall she feels better     Her prior notes     Last office visit November 2022. Due to concerns regarding volume depletion and low blood pressure causing worsening kidney function we held her allopurinol and chlorthalidone. We also instructed her to hold losartan 100 mg which she did not. In the interim, she did not have any weight gain or worsening leg swelling. She reports pain in the big toe. She checks blood pressure at home and average reading 120-130/50-60. She had blood work done prior to this visit and all results were discussed with her in details today including slightly improving serum creatinine 1.1 from 2.2 and GFR improved 26 from 22. Uric acid is worsening after holding allopurinol 8.5. She has anemia with hemoglobin 10.9 and iron deficiency. She had SPEP was negative. No albuminuria was bland urine. Vitamin D is low. We discussed all these results with her and plan as below      She is visiting us for CKD 3 and she has denied any symptoms like leg swelling, SOB after stopping HCTZ (as per our instructions last office visit due to worsening kidney function and concerns about volume depletion). She is on low salt diet and she has blood work done on this visit . She is anemic-discussed with her starting iron infusion as opposed to p.o.  pills-agreed. He continues to be on losartan 100 mg daily  Her uric acid also elevated and she complained of great toe pain on and off will resume allopurinol and monitor . Her vitamin D is also low and will start on Weekly supplements and patient agreed for the plan. Her electrolytes has been normal and will give follow up in 6 months . she has been improving in her kidney function trying to reach baselin.    Her weight is stable after stopping HCTZ. No protein leak. Will start on SGLT-2 inhibitors.       Per prior notes    Last office visit September 2022. We discussed worsening kidney function. We encouraged appropriate hydration. We stopped metformin for lower GFR. In interim, she feels okay. Her blood pressure has been running high recently. She is on insulin. She denies increased urine output or dehydration. She keeps herself well-hydrated. No NSAID use at home. She started watching her salt. She had blood work done few days ago and all results were discussed with her in details including worsening serum creatinine 2.2 and worsening GFR 22. Within normal electrolytes. Borderline low sodium reflecting increased fluid intake. Urine analysis is bland with no blood or protein. Uric acid is normal 5.6 on allopurinol 200 mg. She does check blood pressure at home and readings are fluctuating but mostly running on the lower side. I discussed the results with her. We agreed we will follow conservative measures.     Prior notes     Kathya came alone today. She is aware of history of chronic kidney disease stage III. Recently, she reports decreased p.o. fluid intake and fresh greens in her diet. She thinks she might have been dehydrated. She uses NSAIDs occasionally for acute gout flare and joint pain. She reports low blood sugar recently and metformin was held. She noticed her blood sugar has been rising and she resumed taking metformin 1000 mg twice daily. She checks blood pressure at home and average reading 130 over  "60s. No lower urinary tract symptoms. No recent contrast exposure. She had blood work done in July 2022 that showed worsening GFR from her baseline 35 down to 28. She had kidney ultrasound done that did not show hydronephrosis or postrenal obstruction.     Social history: Non-smoker, , no alcohol  Family history: Relevant      Objective   /64 (BP Location: Left arm, Patient Position: Standing, BP Cuff Size: Large adult)   Pulse 57   Temp 36.3 °C (97.3 °F)   Ht 1.556 m (5' 1.25\")   Wt 86.8 kg (191 lb 6.4 oz)   SpO2 95%   BMI 35.87 kg/m²   Wt Readings from Last 3 Encounters:   12/02/24 86.8 kg (191 lb 6.4 oz)   09/09/24 88 kg (194 lb)   06/28/24 89.8 kg (198 lb)       Physical Exam    General appearance: no distress awake and alert on room air, euvolemic on exam  Eyes: non-icteric  HEENT: atrumatic head, PEERLA, moist mucosa  Skin: no apparent rash  Heart: NSR, S1, S2 normal, no murmur or gallop  Lungs: Symmetrical expansion,CTA bilat no wheezing/crackles  Abdomen: soft, nt/nd, obese  Extremities: no edema bilat  Neuro: No FND,asterixis, no focal deficits noticed        Review of Systems     Constitutional: no fever, no chills, no recent weight gain and no recent weight loss.   Eyes: no blurred vision and no diplopia.   ENT: no hearing loss, no earache, no sore throat, no swollen glands in the neck and no nasal discharge.   Cardiovascular: no chest pain, no palpitations and no lower extremity edema.   Respiratory: no shortness of breath, no chronic cough and no shortness of breath during exertion.   Gastrointestinal: no abdominal pain, no constipation, no heartburn, no vomiting, no bloody stools and no change in bowel movements.   Genitourinary: no dysuria and no hematuria.   Musculoskeletal: no arthralgias and no myalgias.   Skin: no rashes and no skin lesions.   Neurological: no headaches and no dizziness.   Psychiatric: no confusion, no depression and no anxiety.   Endocrine: no heat intolerance, " no cold intolerance, appetite not increased, no thyroid disorder, no increased urinary frequency and no dry skin.   Hematologic/Lymphatic: does not bleed easily and does not bruise easily.   All other systems have been reviewed and are negative for complaint.         Data Review        Results from last 7 days   Lab Units 11/27/24  1021   WBC AUTO x10*3/uL 9.2   HEMOGLOBIN g/dL 12.9   HEMATOCRIT % 41.5   PLATELETS AUTO x10*3/uL 235        Lab Results   Component Value Date    URICACID 4.3 11/27/2024       Lab Results   Component Value Date    HGBA1C 6.4 (H) 11/27/2024       Results from last 7 days   Lab Units 11/27/24  1021   SODIUM mmol/L 139   POTASSIUM mmol/L 5.0   CHLORIDE mmol/L 103   CO2 mmol/L 26   BUN mg/dL 37*   GLUCOSE mg/dL 76   CALCIUM mg/dL 9.1   ANION GAP mmol/L 15   EGFR mL/min/1.73m*2 32*       Albumin/Creatinine Ratio   Date Value Ref Range Status   11/27/2024 22.9 <30.0 ug/mg Creat Final   05/24/2024 63.4 (H) <30.0 ug/mg Creat Final     Albumin/Creatine Ratio   Date Value Ref Range Status   04/26/2023 18.2 0.0 - 30.0 ug/mg crt Final       RFP  Recent Labs     11/27/24  1021 05/24/24  1111 02/01/24  1348 11/17/23  1451 04/26/23  1355 01/20/23  1405 11/09/22  1200    139 136 135* 134* 136 132*   K 5.0 5.1 5.2 5.3 5.3 4.9 5.0    105 102 101 100 102 99   CO2 26 24 23 23 24 26 24   BUN 37* 36* 30* 31* 32* 32* 42*   CREATININE 1.63* 1.80* 1.95* 1.71* 1.80* 1.90* 2.22*   GLUCOSE 76 124* 106* 187* 328* 128* 288*   CALCIUM 9.1 8.8 9.0 9.2 9.5 9.3 9.3   PHOS 4.7 5.0*  --  4.6 4.1 4.6 4.8   EGFR 32* 28* 26* 30*  --   --   --    ANIONGAP 15 15 16 16 15 13 14        Urineanalysis  Recent Labs     01/23/23  1011 11/09/22  1200 09/06/22  1500   COLORU YELLOW YELLOW  --    APPEARANCEU CLEAR CLEAR  --    SPECGRAVU 1.010 1.010  --    NOEL 5.5 5.0  --    PROTUR 6  NEGATIVE NEGATIVE 28*   GLUCOSEU NEGATIVE NEGATIVE  --    BLOODU NEGATIVE NEGATIVE  --    KETONESU NEGATIVE NEGATIVE  --    BILIRUBINU  "NEGATIVE NEGATIVE  --    NITRITEU NEGATIVE NEGATIVE  --    LEUKOCYTESU NEGATIVE NEGATIVE  --        Urine Electrolytes  Recent Labs     11/27/24  1021 05/24/24  1134 04/26/23  1355 01/23/23  1011 11/09/22  1200 09/06/22  1500 07/07/22  1220   CREATU 59.3 74.8 75.3 37.4  37.4 74.4 83.3  83.3 240.0   PROTUR  --   --   --  6  NEGATIVE NEGATIVE 28*  --    UTPCR  --   --   --  0.16  --  0.34*  --    ALBUMINUR 13.6 47.4  --   --   --   --   --    MICROALBCREA 22.9 63.4* 18.2 30.5* 26.9 129.4* 36.5*        Urine Micro  No results for input(s): \"WBCU\", \"RBCU\", \"HYALCASTU\", \"SQUAMEPIU\", \"BACTERIAU\", \"MUCUSU\" in the last 10587 hours.     Iron  Recent Labs     11/27/24  1021 05/24/24  1111 04/26/23  1355 01/20/23  1405 11/09/22  1200 07/07/22  1225 12/28/20  1017   IRON 68 90 68 61 66 61 66   TIBC 261 255 230* 268 263 295 273   IRONSAT 26 35 30 23* 25 21* 24*   FERRITIN 312* 324* 727* 113 149 117 103        Current Outpatient Medications on File Prior to Visit   Medication Sig Dispense Refill    allopurinol (Zyloprim) 100 mg tablet Take 2 tablets (200 mg) by mouth once daily. 180 tablet 3    amitriptyline (Elavil) 50 mg tablet TAKE 1 TABLET BY MOUTH ONCE DAILY AT BEDTIME 90 tablet 1    aspirin 81 mg capsule Take 1 tablet by mouth once daily.      carvedilol (Coreg) 12.5 mg tablet Take 1 tablet by mouth twice daily 180 tablet 1    cyclobenzaprine (Flexeril) 10 mg tablet Take 1 tablet (10 mg) by mouth once daily at bedtime.      ergocalciferol (Vitamin D-2) 1.25 MG (66501 UT) capsule Take 1 capsule by mouth once a week 13 capsule 0    Farxiga 10 mg Take 1 tablet (10 mg) by mouth once daily. 90 tablet 3    fluticasone (Flonase) 50 mcg/actuation nasal spray Administer 2 sprays into each nostril once daily. Shake gently. Before first use, prime pump. After use, clean tip and replace cap. 16 g 2    FreeStyle Gabbi reader (FreeStyle Gabbi 2 [place]) misc Check sugar 4 x daily 1 each 0    FreeStyle Gabbi sensor system (FreeStyle " "Gabbi 2 Sensor) kit Check sugar 4 times daily 4 each 0    HYDROcodone-acetaminophen (Norco) 5-325 mg tablet Take 1 tablet by mouth every 6 hours if needed for pain.      insulin aspart (NovoLOG Flexpen U-100 Insulin) 100 unit/mL (3 mL) pen Inject 12 units 2 times daily before meals 18 mL 12    insulin glargine (Lantus Solostar U-100 Insulin) 100 unit/mL (3 mL) pen Inject 60 Units under the skin 2 times a day. Take as directed per insulin instructions. 144 mL 1    losartan (Cozaar) 100 mg tablet Take 1 tablet by mouth once daily 90 tablet 2    OneTouch Delica Plus Lancet 33 gauge misc       OneTouch Ultra Control solution       OneTouch Ultra Test strip OneTouch Ultra In Vitro Strip   Quantity: 100  Refills: 0        Start : 26-Jul-2022   Active      pantoprazole (ProtoNix) 40 mg EC tablet Take 1 tablet by mouth once daily 90 tablet 1    pen needle, diabetic 31 gauge x 15/64\" needle USE 2  TWICE DAILY 200 each 2    simvastatin (Zocor) 20 mg tablet Take 1 tablet by mouth once daily 90 tablet 2    vit A/vit C/vit E/zinc/copper (VITAMINS A,C,E-ZINC-COPPER ORAL) Take 1 capsule by mouth once daily.       No current facility-administered medications on file prior to visit.       Assessment and Plan     Kathya Ruggiero  is a 79 y.o. 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 Side, rt. Lumbar radiculopathy presents for 6 month follow up for CKD.     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 Was initially worsening but recently leveling off  - Her diabetes was not well controlled previously and discussion was done with her to control her diet  - Baseline serum creatinine 1.8-2, GFR 20-30 mils per minute 1.73 mÂ²  - Most recent serum creatinine is at 1.6, GFR 32 which is her baseline  - Stopping HCTZ have improved the kidney function without " much effect on her volume status-she continues to be euvolemic  - Kidney ultrasound done in August 2022 was reviewed and showed increased echogenicity bilateral with no postrenal obstruction. Multiple cysts were identified. In the setting of previosuly worsening kidney functio kidney ultrasound was repeated in December 2022 - no obstruction but with increased echogenicity bilateral  - Continue conservative measures => Encouraged good hydration.   -Continue Farxiga 10 mg every day      # Chronic Anemia  - Iron deficiency anemia/chronic kidney disease - improved status post IV iron infusion in 2023.  - Within normal SPEP, folic acid, vitamin B12 levels  - Iron storage results are normal from 05/24/2024  - Hemoglobin is stable at ~ 12. Most recent H/H at 12.4/ 39.9 from 05/24/2024     # Hypertension  - Reported to be controlled per prior office visits and in this office visit. Takes reads at home  - Current medication include carvedilol 12.5 mg twice daily, losartan 100 mg QD  - Continue to hold HCTZ  - since then no symptoms and no leg swelling   - Continue current meds    # Insulin Dependent Type 2 diabetes -in target/good control  - Last A1c 6.4  - Previously started on Farxiga 10 mg- to benefit for diabetes control with preserving kidney function.  - Other meds: Novolog flex pen     # Recurrent gout-no recent flares  - Most recent UA is normal  -Continue allopurinol 200 mg every day      # CKD/ MBD  -Within normal calcium, phosphorus is now normal  - Elevated PTH-stable, vitamin D is normal     # CVS- high risk. On simvastatin 20 mg once daily     Follow-up in 6 months with repeat blood work and urinalysis prior to next visit.     Erasmo Hunter MD, MS, JELLY ALVARADO   Clinical  - [employer]   Nephrologist - [facility]

## 2024-12-04 ENCOUNTER — APPOINTMENT (OUTPATIENT)
Dept: NEPHROLOGY | Facility: CLINIC | Age: 79
End: 2024-12-04
Payer: MEDICARE

## 2024-12-09 ENCOUNTER — APPOINTMENT (OUTPATIENT)
Dept: PRIMARY CARE | Facility: CLINIC | Age: 79
End: 2024-12-09
Payer: MEDICARE

## 2024-12-09 VITALS
SYSTOLIC BLOOD PRESSURE: 160 MMHG | BODY MASS INDEX: 37.89 KG/M2 | DIASTOLIC BLOOD PRESSURE: 77 MMHG | WEIGHT: 193 LBS | OXYGEN SATURATION: 97 % | HEIGHT: 60 IN | HEART RATE: 63 BPM

## 2024-12-09 DIAGNOSIS — N18.4 TYPE 2 DIABETES MELLITUS WITH STAGE 4 CHRONIC KIDNEY DISEASE, WITH LONG-TERM CURRENT USE OF INSULIN (MULTI): Primary | ICD-10-CM

## 2024-12-09 DIAGNOSIS — E11.9 DIABETES MELLITUS WITHOUT COMPLICATION (MULTI): ICD-10-CM

## 2024-12-09 DIAGNOSIS — Z79.4 TYPE 2 DIABETES MELLITUS WITH STAGE 4 CHRONIC KIDNEY DISEASE, WITH LONG-TERM CURRENT USE OF INSULIN (MULTI): Primary | ICD-10-CM

## 2024-12-09 DIAGNOSIS — I10 PRIMARY HYPERTENSION: ICD-10-CM

## 2024-12-09 DIAGNOSIS — E11.22 TYPE 2 DIABETES MELLITUS WITH STAGE 4 CHRONIC KIDNEY DISEASE, WITH LONG-TERM CURRENT USE OF INSULIN (MULTI): Primary | ICD-10-CM

## 2024-12-09 PROCEDURE — 3078F DIAST BP <80 MM HG: CPT | Performed by: INTERNAL MEDICINE

## 2024-12-09 PROCEDURE — 1036F TOBACCO NON-USER: CPT | Performed by: INTERNAL MEDICINE

## 2024-12-09 PROCEDURE — 1124F ACP DISCUSS-NO DSCNMKR DOCD: CPT | Performed by: INTERNAL MEDICINE

## 2024-12-09 PROCEDURE — 99213 OFFICE O/P EST LOW 20 MIN: CPT | Performed by: INTERNAL MEDICINE

## 2024-12-09 PROCEDURE — 1159F MED LIST DOCD IN RCRD: CPT | Performed by: INTERNAL MEDICINE

## 2024-12-09 PROCEDURE — 3077F SYST BP >= 140 MM HG: CPT | Performed by: INTERNAL MEDICINE

## 2024-12-09 RX ORDER — INSULIN ASPART 100 [IU]/ML
INJECTION, SOLUTION INTRAVENOUS; SUBCUTANEOUS
Qty: 18 ML | Refills: 12 | Status: SHIPPED | OUTPATIENT
Start: 2024-12-09

## 2024-12-09 RX ORDER — INSULIN GLARGINE 100 [IU]/ML
50 INJECTION, SOLUTION SUBCUTANEOUS 2 TIMES DAILY
Qty: 90 ML | Refills: 1 | Status: SHIPPED | OUTPATIENT
Start: 2024-12-09 | End: 2025-06-07

## 2024-12-09 NOTE — PROGRESS NOTES
Subjective   Patient ID: Kathya Ruggiero is a 79 y.o. female who presents for Follow-up (Kathya is here today for routine F/U. Pt would like to discuss her diabetes . Pt had A1C completed on 11/27/24 and that was 6.4%).    HPI   She has several low blood sugar events below 70s. She becomes hypoglycemic around 4am very consistent according to her glucose monitor.     Blood pressure was low at Dr. BETTENCOURT's office. Blood pressure is well controlled at home  - 120s/60s.     Review of Systems   All other systems reviewed and are negative.      Objective   /77 (BP Location: Left arm, Patient Position: Sitting)   Pulse 63   Ht 1.524 m (5')   Wt 87.5 kg (193 lb)   SpO2 97%   BMI 37.69 kg/m²     Physical Exam  Vitals reviewed.   Cardiovascular:      Rate and Rhythm: Normal rate and regular rhythm.      Heart sounds: Normal heart sounds. No murmur heard.     No gallop.   Pulmonary:      Effort: Pulmonary effort is normal. No respiratory distress.      Breath sounds: Normal breath sounds.   Abdominal:      General: Abdomen is flat. There is no distension.      Palpations: Abdomen is soft.      Tenderness: There is no abdominal tenderness.   Musculoskeletal:      Right lower leg: No edema.      Left lower leg: No edema.   Skin:     General: Skin is warm and dry.   Neurological:      Mental Status: She is alert. Mental status is at baseline.   Psychiatric:         Mood and Affect: Mood normal.         Behavior: Behavior normal.         Assessment/Plan   #DM2  -A1C 6.4%   -Cont Farxiga 10mg daily  -Decrease Lantus to 50 units BID, previously on 60u BID (having am hypoglycemia)  -Cont Novolog 12U AC      #HTN  -BP is good at home. Cont losartan 100mg daily and carvedilol 12.5mg BID      #HLD  -Cont simvastatin 20mg     #Insomia  -Continue amitriptyline 50mg nightly      #CKD4  -Following with Dr. Hunter.      #Gout   -Cont allopurinol 100mg    #Right knee pain  -follows with orthopedics - [facility]     Influenza: UTD  COVID:  x3   Prevnar 13: UTD   Pneumovax 23: UTD   Prevnar 20: 4/2024  RSV: x1  Shingrix: x2  Mamm: 6/29/21  DEXA: 6/10/2020--normal   Pap: NI   Colorectal ca: 9/12/13  Lung ca screening: NI      RTC 6 months    Misael Kowalski DO  Internal Medicine, PGY-II    I saw and evaluated the patient. I personally obtained the key and critical portions of the history and physical exam or was physically present for key and critical portions performed by the resident/fellow. I reviewed the resident/fellow's documentation and discussed the patient with the resident/fellow. I agree with the resident/fellow's medical decision making as documented in the note.    Mando Messer, DO

## 2024-12-31 DIAGNOSIS — E55.9 VITAMIN D DEFICIENCY: ICD-10-CM

## 2024-12-31 DIAGNOSIS — E78.5 HYPERLIPIDEMIA, UNSPECIFIED HYPERLIPIDEMIA TYPE: ICD-10-CM

## 2025-01-02 DIAGNOSIS — E55.9 VITAMIN D DEFICIENCY: ICD-10-CM

## 2025-01-02 RX ORDER — SIMVASTATIN 20 MG/1
TABLET, FILM COATED ORAL
Qty: 90 TABLET | Refills: 1 | Status: SHIPPED | OUTPATIENT
Start: 2025-01-02

## 2025-01-02 RX ORDER — ERGOCALCIFEROL 1.25 MG/1
1 CAPSULE ORAL
Qty: 13 CAPSULE | Refills: 3 | Status: SHIPPED | OUTPATIENT
Start: 2025-01-05

## 2025-01-02 RX ORDER — ERGOCALCIFEROL 1.25 MG/1
1 CAPSULE ORAL
Qty: 13 CAPSULE | Refills: 0 | OUTPATIENT
Start: 2025-01-02

## 2025-01-27 DIAGNOSIS — G47.00 INSOMNIA, UNSPECIFIED TYPE: ICD-10-CM

## 2025-01-28 RX ORDER — AMITRIPTYLINE HYDROCHLORIDE 50 MG/1
50 TABLET, FILM COATED ORAL NIGHTLY
Qty: 90 TABLET | Refills: 1 | Status: SHIPPED | OUTPATIENT
Start: 2025-01-28

## 2025-01-31 DIAGNOSIS — Z79.4 TYPE 2 DIABETES MELLITUS WITH STAGE 4 CHRONIC KIDNEY DISEASE, WITH LONG-TERM CURRENT USE OF INSULIN (MULTI): ICD-10-CM

## 2025-01-31 DIAGNOSIS — E11.22 TYPE 2 DIABETES MELLITUS WITH STAGE 4 CHRONIC KIDNEY DISEASE, WITH LONG-TERM CURRENT USE OF INSULIN (MULTI): ICD-10-CM

## 2025-01-31 DIAGNOSIS — I10 PRIMARY HYPERTENSION: ICD-10-CM

## 2025-01-31 DIAGNOSIS — N18.4 TYPE 2 DIABETES MELLITUS WITH STAGE 4 CHRONIC KIDNEY DISEASE, WITH LONG-TERM CURRENT USE OF INSULIN (MULTI): ICD-10-CM

## 2025-02-02 RX ORDER — PEN NEEDLE, DIABETIC 32GX 5/32"
NEEDLE, DISPOSABLE MISCELLANEOUS
Qty: 200 EACH | Refills: 1 | Status: SHIPPED | OUTPATIENT
Start: 2025-02-02

## 2025-02-02 RX ORDER — LOSARTAN POTASSIUM 100 MG/1
100 TABLET ORAL DAILY
Qty: 90 TABLET | Refills: 1 | Status: SHIPPED | OUTPATIENT
Start: 2025-02-02

## 2025-02-12 ENCOUNTER — OFFICE VISIT (OUTPATIENT)
Dept: PRIMARY CARE | Facility: CLINIC | Age: 80
End: 2025-02-12
Payer: MEDICARE

## 2025-02-12 VITALS
OXYGEN SATURATION: 97 % | WEIGHT: 193 LBS | HEART RATE: 71 BPM | BODY MASS INDEX: 37.69 KG/M2 | SYSTOLIC BLOOD PRESSURE: 156 MMHG | DIASTOLIC BLOOD PRESSURE: 73 MMHG

## 2025-02-12 DIAGNOSIS — J01.90 ACUTE NON-RECURRENT SINUSITIS, UNSPECIFIED LOCATION: Primary | ICD-10-CM

## 2025-02-12 PROCEDURE — 1159F MED LIST DOCD IN RCRD: CPT | Performed by: INTERNAL MEDICINE

## 2025-02-12 PROCEDURE — 1124F ACP DISCUSS-NO DSCNMKR DOCD: CPT | Performed by: INTERNAL MEDICINE

## 2025-02-12 PROCEDURE — 99213 OFFICE O/P EST LOW 20 MIN: CPT | Performed by: INTERNAL MEDICINE

## 2025-02-12 PROCEDURE — 3077F SYST BP >= 140 MM HG: CPT | Performed by: INTERNAL MEDICINE

## 2025-02-12 PROCEDURE — 3078F DIAST BP <80 MM HG: CPT | Performed by: INTERNAL MEDICINE

## 2025-02-12 PROCEDURE — 1126F AMNT PAIN NOTED NONE PRSNT: CPT | Performed by: INTERNAL MEDICINE

## 2025-02-12 RX ORDER — DOXYCYCLINE 100 MG/1
100 CAPSULE ORAL 2 TIMES DAILY
Qty: 20 CAPSULE | Refills: 0 | Status: SHIPPED | OUTPATIENT
Start: 2025-02-12 | End: 2025-02-22

## 2025-02-12 ASSESSMENT — PAIN SCALES - GENERAL: PAINLEVEL_OUTOF10: 0-NO PAIN

## 2025-02-12 ASSESSMENT — ENCOUNTER SYMPTOMS
COUGH: 1
HEADACHES: 1

## 2025-02-12 NOTE — PROGRESS NOTES
Subjective   Patient ID: Kathya Ruggiero is a 79 y.o. female who presents for Nasal Congestion, Cough, and Headache.    Cough  Associated symptoms include headaches.   Headache   Associated symptoms include coughing.        Duration: 1 week   Cough: productive   SOB: No  Fever: No  Chills: No  Body aches: yes   N/V: No   Diarrhea: No   Sinus: pressure, congestion and drainage   Ears: No  Home remedies: Mucinex       Review of Systems   Respiratory:  Positive for cough.    Neurological:  Positive for headaches.       Objective   /73   Pulse 71   Wt 87.5 kg (193 lb)   SpO2 97%   BMI 37.69 kg/m²     Physical Exam  Constitutional:       Appearance: Normal appearance.   HENT:      Head: Normocephalic and atraumatic.   Cardiovascular:      Rate and Rhythm: Normal rate and regular rhythm.      Heart sounds: Normal heart sounds. No murmur heard.     No gallop.   Pulmonary:      Effort: Pulmonary effort is normal. No respiratory distress.      Breath sounds: No wheezing or rales.   Skin:     General: Skin is warm and dry.      Findings: No rash.   Neurological:      Mental Status: She is alert and oriented to person, place, and time. Mental status is at baseline.   Psychiatric:         Mood and Affect: Mood normal.         Behavior: Behavior normal.         Assessment/Plan       #Sinusitis   -Rx doxycycline 100mg bid x 10 days. Recommended using Flonase that she already has at home.       [phone]

## 2025-03-04 DIAGNOSIS — E11.9 DIABETES MELLITUS WITHOUT COMPLICATION (MULTI): Primary | ICD-10-CM

## 2025-03-05 RX ORDER — ALCOHOL ANTISEPTIC PADS
PADS, MEDICATED (EA) TOPICAL
Qty: 200 EACH | Refills: 1 | Status: SHIPPED | OUTPATIENT
Start: 2025-03-05

## 2025-03-05 RX ORDER — PEN NEEDLE, DIABETIC 31 GX5/16"
NEEDLE, DISPOSABLE MISCELLANEOUS
Qty: 100 EACH | Refills: 1 | Status: SHIPPED | OUTPATIENT
Start: 2025-03-05

## 2025-03-05 RX ORDER — BLOOD SUGAR DIAGNOSTIC
STRIP MISCELLANEOUS
Qty: 100 STRIP | Refills: 1 | Status: SHIPPED | OUTPATIENT
Start: 2025-03-05

## 2025-03-07 ENCOUNTER — TELEPHONE (OUTPATIENT)
Dept: ENDOCRINOLOGY | Facility: CLINIC | Age: 80
End: 2025-03-07
Payer: MEDICARE

## 2025-03-07 NOTE — TELEPHONE ENCOUNTER
Kathya called to see if  was taking on new patients. I told her yes, and that we are currently scheduling in the month of June. She is going to have her PCP fax over a referral.

## 2025-03-25 DIAGNOSIS — E11.9 DIABETES MELLITUS WITHOUT COMPLICATION: ICD-10-CM

## 2025-03-26 RX ORDER — PEN NEEDLE, DIABETIC 30 GX3/16"
100 NEEDLE, DISPOSABLE MISCELLANEOUS 2 TIMES DAILY
Qty: 100 EACH | Refills: 1 | Status: SHIPPED | OUTPATIENT
Start: 2025-03-26

## 2025-04-03 ENCOUNTER — OFFICE VISIT (OUTPATIENT)
Dept: PRIMARY CARE | Facility: CLINIC | Age: 80
End: 2025-04-03
Payer: MEDICARE

## 2025-04-03 VITALS
DIASTOLIC BLOOD PRESSURE: 57 MMHG | OXYGEN SATURATION: 93 % | SYSTOLIC BLOOD PRESSURE: 139 MMHG | HEART RATE: 64 BPM | TEMPERATURE: 98.9 F

## 2025-04-03 DIAGNOSIS — J40 BRONCHITIS: Primary | ICD-10-CM

## 2025-04-03 PROCEDURE — 1159F MED LIST DOCD IN RCRD: CPT | Performed by: INTERNAL MEDICINE

## 2025-04-03 PROCEDURE — 3078F DIAST BP <80 MM HG: CPT | Performed by: INTERNAL MEDICINE

## 2025-04-03 PROCEDURE — 3075F SYST BP GE 130 - 139MM HG: CPT | Performed by: INTERNAL MEDICINE

## 2025-04-03 PROCEDURE — 99213 OFFICE O/P EST LOW 20 MIN: CPT | Performed by: INTERNAL MEDICINE

## 2025-04-03 RX ORDER — AMLODIPINE BESYLATE 10 MG/1
1 TABLET ORAL
COMMUNITY
Start: 2025-03-21

## 2025-04-03 RX ORDER — DOXYCYCLINE 100 MG/1
100 CAPSULE ORAL 2 TIMES DAILY
Qty: 14 CAPSULE | Refills: 0 | Status: SHIPPED | OUTPATIENT
Start: 2025-04-03 | End: 2025-04-10

## 2025-04-03 RX ORDER — ALBUTEROL SULFATE 90 UG/1
2 INHALANT RESPIRATORY (INHALATION) EVERY 4 HOURS PRN
Qty: 8 G | Refills: 2 | Status: SHIPPED | OUTPATIENT
Start: 2025-04-03 | End: 2026-04-03

## 2025-04-03 ASSESSMENT — COLUMBIA-SUICIDE SEVERITY RATING SCALE - C-SSRS
2. HAVE YOU ACTUALLY HAD ANY THOUGHTS OF KILLING YOURSELF?: NO
6. HAVE YOU EVER DONE ANYTHING, STARTED TO DO ANYTHING, OR PREPARED TO DO ANYTHING TO END YOUR LIFE?: NO
1. IN THE PAST MONTH, HAVE YOU WISHED YOU WERE DEAD OR WISHED YOU COULD GO TO SLEEP AND NOT WAKE UP?: NO

## 2025-04-03 ASSESSMENT — PATIENT HEALTH QUESTIONNAIRE - PHQ9
SUM OF ALL RESPONSES TO PHQ9 QUESTIONS 1 AND 2: 0
1. LITTLE INTEREST OR PLEASURE IN DOING THINGS: NOT AT ALL
2. FEELING DOWN, DEPRESSED OR HOPELESS: NOT AT ALL

## 2025-04-03 NOTE — PROGRESS NOTES
Subjective   Patient ID: Kathya Ruggiero is a 80 y.o. female who presents for Cough, Nasal Congestion, Chills, and Sore Throat.    HPI     Duration: 5 days   Cough: dry   SOB: No   Fever: yes   Chills: yes   Body aches: yes  N/V: no   Diarrhea: no   Sinus: yes congestion/pressure and drainage   Ears: No   Home remedies: nO       Review of Systems    Objective   /57   Pulse 64   Temp 37.2 °C (98.9 °F)   SpO2 93%     Physical Exam    Assessment/Plan   {Assess/PlanSmartLinks:15024}

## 2025-04-10 DIAGNOSIS — J40 BRONCHITIS: Primary | ICD-10-CM

## 2025-04-10 RX ORDER — BENZONATATE 200 MG/1
200 CAPSULE ORAL 3 TIMES DAILY PRN
Qty: 42 CAPSULE | Refills: 0 | Status: SHIPPED | OUTPATIENT
Start: 2025-04-10 | End: 2025-05-10

## 2025-04-22 DIAGNOSIS — R05.9 COUGH, UNSPECIFIED TYPE: ICD-10-CM

## 2025-04-24 RX ORDER — FLUTICASONE PROPIONATE 50 MCG
SPRAY, SUSPENSION (ML) NASAL
Qty: 16 G | Refills: 1 | Status: SHIPPED | OUTPATIENT
Start: 2025-04-24

## 2025-04-29 DIAGNOSIS — N18.32 STAGE 3B CHRONIC KIDNEY DISEASE (MULTI): ICD-10-CM

## 2025-04-29 DIAGNOSIS — E66.812 CLASS 2 SEVERE OBESITY WITH BODY MASS INDEX (BMI) OF 35 TO 39.9 WITH SERIOUS COMORBIDITY: ICD-10-CM

## 2025-04-29 DIAGNOSIS — E55.9 VITAMIN D DEFICIENCY: ICD-10-CM

## 2025-04-29 DIAGNOSIS — I10 PRIMARY HYPERTENSION: ICD-10-CM

## 2025-04-29 DIAGNOSIS — E66.01 CLASS 2 SEVERE OBESITY WITH BODY MASS INDEX (BMI) OF 35 TO 39.9 WITH SERIOUS COMORBIDITY: ICD-10-CM

## 2025-04-29 DIAGNOSIS — M10.00 ACUTE IDIOPATHIC GOUT, UNSPECIFIED SITE: ICD-10-CM

## 2025-04-29 DIAGNOSIS — D50.8 OTHER IRON DEFICIENCY ANEMIA: ICD-10-CM

## 2025-05-04 DIAGNOSIS — K21.9 CHRONIC GASTROESOPHAGEAL REFLUX DISEASE: ICD-10-CM

## 2025-05-06 LAB
25(OH)D3+25(OH)D2 SERPL-MCNC: 80 NG/ML (ref 30–100)
ALBUMIN SERPL-MCNC: 4.5 G/DL (ref 3.6–5.1)
BUN SERPL-MCNC: 25 MG/DL (ref 7–25)
BUN/CREAT SERPL: 13 (CALC) (ref 6–22)
CALCIUM SERPL-MCNC: 9.7 MG/DL (ref 8.6–10.4)
CHLORIDE SERPL-SCNC: 104 MMOL/L (ref 98–110)
CO2 SERPL-SCNC: 27 MMOL/L (ref 20–32)
CREAT SERPL-MCNC: 1.94 MG/DL (ref 0.6–0.95)
EGFRCR SERPLBLD CKD-EPI 2021: 25 ML/MIN/1.73M2
ERYTHROCYTE [DISTWIDTH] IN BLOOD BY AUTOMATED COUNT: 14.6 % (ref 11–15)
FERRITIN SERPL-MCNC: 296 NG/ML (ref 16–288)
GLUCOSE SERPL-MCNC: 53 MG/DL (ref 65–99)
HCT VFR BLD AUTO: 37 % (ref 35–45)
HGB BLD-MCNC: 11.8 G/DL (ref 11.7–15.5)
IRON SATN MFR SERPL: 27 % (CALC) (ref 16–45)
IRON SERPL-MCNC: 65 MCG/DL (ref 45–160)
MCH RBC QN AUTO: 28.9 PG (ref 27–33)
MCHC RBC AUTO-ENTMCNC: 31.9 G/DL (ref 32–36)
MCV RBC AUTO: 90.7 FL (ref 80–100)
PHOSPHATE SERPL-MCNC: 4.9 MG/DL (ref 2.1–4.3)
PLATELET # BLD AUTO: 226 THOUSAND/UL (ref 140–400)
PMV BLD REES-ECKER: 9.2 FL (ref 7.5–12.5)
POTASSIUM SERPL-SCNC: 5.3 MMOL/L (ref 3.5–5.3)
PTH-INTACT SERPL-MCNC: 132 PG/ML (ref 16–77)
RBC # BLD AUTO: 4.08 MILLION/UL (ref 3.8–5.1)
SODIUM SERPL-SCNC: 142 MMOL/L (ref 135–146)
TIBC SERPL-MCNC: 245 MCG/DL (CALC) (ref 250–450)
URATE SERPL-MCNC: 4.6 MG/DL (ref 2.5–7)
WBC # BLD AUTO: 10.1 THOUSAND/UL (ref 3.8–10.8)

## 2025-05-06 RX ORDER — PANTOPRAZOLE SODIUM 40 MG/1
40 TABLET, DELAYED RELEASE ORAL DAILY
Qty: 90 TABLET | Refills: 1 | Status: SHIPPED | OUTPATIENT
Start: 2025-05-06

## 2025-05-07 ENCOUNTER — APPOINTMENT (OUTPATIENT)
Dept: NEPHROLOGY | Facility: CLINIC | Age: 80
End: 2025-05-07
Payer: MEDICARE

## 2025-05-07 VITALS
DIASTOLIC BLOOD PRESSURE: 64 MMHG | HEART RATE: 57 BPM | SYSTOLIC BLOOD PRESSURE: 109 MMHG | WEIGHT: 197.8 LBS | BODY MASS INDEX: 37.34 KG/M2 | OXYGEN SATURATION: 95 % | HEIGHT: 61 IN | TEMPERATURE: 97.3 F

## 2025-05-07 DIAGNOSIS — I10 PRIMARY HYPERTENSION: ICD-10-CM

## 2025-05-07 DIAGNOSIS — N18.32 STAGE 3B CHRONIC KIDNEY DISEASE (MULTI): Primary | ICD-10-CM

## 2025-05-07 DIAGNOSIS — E66.01 CLASS 2 SEVERE OBESITY WITH BODY MASS INDEX (BMI) OF 35 TO 39.9 WITH SERIOUS COMORBIDITY: ICD-10-CM

## 2025-05-07 DIAGNOSIS — D50.8 OTHER IRON DEFICIENCY ANEMIA: ICD-10-CM

## 2025-05-07 DIAGNOSIS — I10 PRIMARY HYPERTENSION: Primary | ICD-10-CM

## 2025-05-07 DIAGNOSIS — E66.812 CLASS 2 SEVERE OBESITY WITH BODY MASS INDEX (BMI) OF 35 TO 39.9 WITH SERIOUS COMORBIDITY: ICD-10-CM

## 2025-05-07 DIAGNOSIS — E55.9 VITAMIN D DEFICIENCY: ICD-10-CM

## 2025-05-07 PROCEDURE — 99214 OFFICE O/P EST MOD 30 MIN: CPT | Performed by: INTERNAL MEDICINE

## 2025-05-07 PROCEDURE — 1159F MED LIST DOCD IN RCRD: CPT | Performed by: INTERNAL MEDICINE

## 2025-05-07 PROCEDURE — 3074F SYST BP LT 130 MM HG: CPT | Performed by: INTERNAL MEDICINE

## 2025-05-07 PROCEDURE — 3078F DIAST BP <80 MM HG: CPT | Performed by: INTERNAL MEDICINE

## 2025-05-07 PROCEDURE — G2211 COMPLEX E/M VISIT ADD ON: HCPCS | Performed by: INTERNAL MEDICINE

## 2025-05-07 RX ORDER — HYDROCHLOROTHIAZIDE 25 MG/1
12.5 TABLET ORAL DAILY
Qty: 30 TABLET | Refills: 11 | Status: SHIPPED | OUTPATIENT
Start: 2025-05-07 | End: 2025-05-07 | Stop reason: DRUGHIGH

## 2025-05-07 RX ORDER — CALCIUM CARBONATE/VITAMIN D3 500-10/5ML
400 LIQUID (ML) ORAL DAILY
COMMUNITY

## 2025-05-07 NOTE — PROGRESS NOTES
Subjective     Kathya Ruggiero is a 80 y.o. 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 Side, rt. Lumbar radiculopathy presents for 6 month follow up for CKD.    Last office visit December 2024.  She was doing okay.  We held diuretics prior due to concerns about volume depletion.  Kidney function improved after holding diuretics to GFR 32.  In the interim, she suffered from bronchitis/pneumonia treated with antibiotics.  She increased her fluid intake during that time to help with her bronchitis.  Amlodipine was added to her medication list 10 mg for blood pressure control.  Losartan was held?.  She noticed worsening leg swelling and 7 pounds weight gain.  She requested to see me for hypervolemia    Today, upper respiratory symptoms are improving.  Leg swelling.  Abdominal distention.  Not on distress.  Repeat blood work showed stable serum creatinine 1.9 and GFR 25.  Normal electrolytes.  Today we discussed holding amlodipine for possible associated leg swelling, resuming low-dose diuretics.  Will continue to hold losartan-to be resumed upon assessment next office visit    Prior notes    Last office visit May 2024.  Payal came today with her  (happened to be my patient to).  No kidney related complaints or concerns.  She follows instruction.  She limited her dairy intake.  Repeat blood work showed improved serum creatinine 1.6 from prior 1.8, GFR improved 32 from prior 25.  Phosphorus is now normal 4.7 from prior 5.  Blood pressure is in target.  No weight gain.  She continues to be off hydrochlorothiazide.  Uric acid is normal-continue to be on allopurinol-she denies gout flare.  Overall Kathya is stable and she is doing very well.  Will see her again in 6 months    Prior notes    Seen and examined today in office. Patient had no active or acute complaints. We discussed her kidney function is improving. Her  protein leak is better. She had hyperphosphatemia which is likely from increased ice cream intake. We discussed limiting ice cream intake. Overall, doing well. Patient also brought home BP reads. States her BP is well at home.     Per prior Note:    Last office visit May 2023.  No events in the interim.  Kathya came alone today.  No kidney related complaints or concerns.  She is adherent with medication and low-salt diet.  She started using continuous glucose monitor which is helping her to take care of her diabetes.  Blood pressure at home is reported to be 130-140/70.  Blood pressure elevated in office today-asymptomatic.  She had blood work done on 17 November-all results were discussed with her in details including stable serum creatinine 1.7 and GFR 30.  With normal lecture lites.  Within normal vitamin D and uric acid.  No significant anemia.  She is excited to know that she is doing better from the kidney standpoint     Prior notes    Last office visit January 2023. In interim no events. She came alone today. She reports being in her baseline state of health. Blood pressure is accepted. Restarted Farxiga last office visit-blood sugar has been running 130-140. She had blood work done recently and results were discussed with her in details including stable serum creatinine 1.8 and GFR 28 with a normal electrolytes. She underwent a course of IV iron infusion-significantly improved anemia 12.8 and replete iron storage. Last office visit we started weekly vitamin D for vitamin D deficiency-Vitamin D is now normal. Uric acid was elevated-now normal after starting allopurinol. Overall she feels better     Her prior notes     Last office visit November 2022. Due to concerns regarding volume depletion and low blood pressure causing worsening kidney function we held her allopurinol and chlorthalidone. We also instructed her to hold losartan 100 mg which she did not. In the interim, she did not have any weight gain or  worsening leg swelling. She reports pain in the big toe. She checks blood pressure at home and average reading 120-130/50-60. She had blood work done prior to this visit and all results were discussed with her in details today including slightly improving serum creatinine 1.1 from 2.2 and GFR improved 26 from 22. Uric acid is worsening after holding allopurinol 8.5. She has anemia with hemoglobin 10.9 and iron deficiency. She had SPEP was negative. No albuminuria was bland urine. Vitamin D is low. We discussed all these results with her and plan as below      She is visiting us for CKD 3 and she has denied any symptoms like leg swelling, SOB after stopping HCTZ (as per our instructions last office visit due to worsening kidney function and concerns about volume depletion). She is on low salt diet and she has blood work done on this visit . She is anemic-discussed with her starting iron infusion as opposed to p.o. pills-agreed. He continues to be on losartan 100 mg daily  Her uric acid also elevated and she complained of great toe pain on and off will resume allopurinol and monitor . Her vitamin D is also low and will start on Weekly supplements and patient agreed for the plan. Her electrolytes has been normal and will give follow up in 6 months . she has been improving in her kidney function trying to reach baselin.    Her weight is stable after stopping HCTZ. No protein leak. Will start on SGLT-2 inhibitors.       Per prior notes    Last office visit September 2022. We discussed worsening kidney function. We encouraged appropriate hydration. We stopped metformin for lower GFR. In interim, she feels okay. Her blood pressure has been running high recently. She is on insulin. She denies increased urine output or dehydration. She keeps herself well-hydrated. No NSAID use at home. She started watching her salt. She had blood work done few days ago and all results were discussed with her in details including worsening  "serum creatinine 2.2 and worsening GFR 22. Within normal electrolytes. Borderline low sodium reflecting increased fluid intake. Urine analysis is bland with no blood or protein. Uric acid is normal 5.6 on allopurinol 200 mg. She does check blood pressure at home and readings are fluctuating but mostly running on the lower side. I discussed the results with her. We agreed we will follow conservative measures.     Prior notes     Kathya came alone today. She is aware of history of chronic kidney disease stage III. Recently, she reports decreased p.o. fluid intake and fresh greens in her diet. She thinks she might have been dehydrated. She uses NSAIDs occasionally for acute gout flare and joint pain. She reports low blood sugar recently and metformin was held. She noticed her blood sugar has been rising and she resumed taking metformin 1000 mg twice daily. She checks blood pressure at home and average reading 130 over 60s. No lower urinary tract symptoms. No recent contrast exposure. She had blood work done in July 2022 that showed worsening GFR from her baseline 35 down to 28. She had kidney ultrasound done that did not show hydronephrosis or postrenal obstruction.     Social history: Non-smoker, , no alcohol  Family history: Relevant      Objective   /64 (BP Location: Right arm, Patient Position: Standing, BP Cuff Size: Large adult)   Pulse 57   Temp 36.3 °C (97.3 °F)   Ht 1.549 m (5' 1\")   Wt 89.7 kg (197 lb 12.8 oz)   SpO2 95%   BMI 37.37 kg/m²   Wt Readings from Last 3 Encounters:   05/07/25 89.7 kg (197 lb 12.8 oz)   02/12/25 87.5 kg (193 lb)   12/09/24 87.5 kg (193 lb)       Physical Exam    General appearance: no distress awake and alert on room air, euvolemic on exam  Eyes: non-icteric  HEENT: atrumatic head, PEERLA, moist mucosa  Skin: no apparent rash  Heart: NSR, S1, S2 normal, no murmur or gallop  Lungs: Symmetrical expansion,CTA bilat no wheezing/crackles  Abdomen: soft, nt/nd, " obese  Extremities: no edema bilat  Neuro: No FND,asterixis, no focal deficits noticed        Review of Systems     Constitutional weight gain  Eyes: no blurred vision and no diplopia.   ENT: no hearing loss, no earache, no sore throat, no swollen glands in the neck and no nasal discharge.   Cardiovascular: Leg swelling  Respiratory: no shortness of breath, no chronic cough and no shortness of breath during exertion.   Gastrointestinal: no abdominal pain, no constipation, no heartburn, no vomiting, no bloody stools and no change in bowel movements.   Genitourinary: no dysuria and no hematuria.   Musculoskeletal: no arthralgias and no myalgias.   Skin: no rashes and no skin lesions.   Neurological: no headaches and no dizziness.   Psychiatric: no confusion, no depression and no anxiety.   Endocrine: no heat intolerance, no cold intolerance, appetite not increased, no thyroid disorder, no increased urinary frequency and no dry skin.   Hematologic/Lymphatic: does not bleed easily and does not bruise easily.   All other systems have been reviewed and are negative for complaint.         Data Review        Results from last 7 days   Lab Units 05/05/25  1415   [facility] WBC AUTO Thousand/uL 10.1   [facility] HEMOGLOBIN g/dL 11.8   [facility] HEMATOCRIT % 37.0   [facility] PLATELETS AUTO Thousand/uL 226        Lab Results   Component Value Date    URICACID 4.6 05/05/2025       Lab Results   Component Value Date    HGBA1C 6.4 (H) 11/27/2024       Results from last 7 days   Lab Units 05/05/25  1415   [facility] SODIUM mmol/L 142   [facility] POTASSIUM mmol/L 5.3   [facility] CHLORIDE mmol/L 104   [facility] CO2 mmol/L 27   [facility] BUN mg/dL 25   [facility] GLUCOSE mg/dL 53*   [facility] CALCIUM mg/dL 9.7   QUEST EGFR mL/min/1.73m2 25*       Albumin/Creatinine Ratio   Date Value Ref Range Status   11/27/2024 22.9 <30.0 ug/mg Creat Final   05/24/2024 63.4 (H) <30.0 ug/mg Creat Final     Albumin/Creatine Ratio   Date Value Ref Range Status   04/26/2023 18.2 0.0 - 30.0 ug/mg crt  "Final       RFP  Recent Labs     05/05/25  1415 11/27/24  1021 05/24/24  1111 02/01/24  1348 11/17/23  1451 04/26/23  1355 01/20/23  1405 11/09/22  1200    139 139 136 135* 134* 136 132*   K 5.3 5.0 5.1 5.2 5.3 5.3 4.9 5.0    103 105 102 101 100 102 99   CO2 27 26 24 23 23 24 26 24   BUN 25 37* 36* 30* 31* 32* 32* 42*   CREATININE 1.94* 1.63* 1.80* 1.95* 1.71* 1.80* 1.90* 2.22*   GLUCOSE 53* 76 124* 106* 187* 328* 128* 288*   CALCIUM 9.7 9.1 8.8 9.0 9.2 9.5 9.3 9.3   PHOS 4.9* 4.7 5.0*  --  4.6 4.1 4.6 4.8   EGFR 25* 32* 28* 26* 30*  --   --   --    ANIONGAP  --  15 15 16 16 15 13 14        Urineanalysis  Recent Labs     01/23/23  1011 11/09/22  1200 09/06/22  1500   COLORU YELLOW YELLOW  --    APPEARANCEU CLEAR CLEAR  --    SPECGRAVU 1.010 1.010  --    NOEL 5.5 5.0  --    PROTUR 6  NEGATIVE NEGATIVE 28*   GLUCOSEU NEGATIVE NEGATIVE  --    BLOODU NEGATIVE NEGATIVE  --    KETONESU NEGATIVE NEGATIVE  --    BILIRUBINU NEGATIVE NEGATIVE  --    NITRITEU NEGATIVE NEGATIVE  --    LEUKOCYTESU NEGATIVE NEGATIVE  --        Urine Electrolytes  Recent Labs     11/27/24  1021 05/24/24  1134 04/26/23  1355 01/23/23  1011 11/09/22  1200 09/06/22  1500 07/07/22  1220   CREATU 59.3 74.8 75.3 37.4  37.4 74.4 83.3  83.3 240.0   PROTUR  --   --   --  6  NEGATIVE NEGATIVE 28*  --    UTPCR  --   --   --  0.16  --  0.34*  --    ALBUMINUR 13.6 47.4  --   --   --   --   --    MICROALBCREA 22.9 63.4* 18.2 30.5* 26.9 129.4* 36.5*        Urine Micro  No results for input(s): \"WBCU\", \"RBCU\", \"HYALCASTU\", \"SQUAMEPIU\", \"BACTERIAU\", \"MUCUSU\" in the last 62110 hours.     Iron  Recent Labs     05/05/25  1415 11/27/24  1021 05/24/24  1111 04/26/23  1355 01/20/23  1405 11/09/22  1200 07/07/22  1225 12/28/20  1017   IRON 65 68 90 68 61 66 61 66   TIBC 245* 261 255 230* 268 263 295 273   IRONSAT 27 26 35 30 23* 25 21* 24*   FERRITIN 296* 312* 324* 727* 113 149 117 103        Current Outpatient Medications on File Prior to Visit "   Medication Sig Dispense Refill    albuterol (Ventolin HFA) 90 mcg/actuation inhaler Inhale 2 puffs every 4 hours if needed for wheezing or shortness of breath. 8 g 2    allopurinol (Zyloprim) 100 mg tablet Take 2 tablets (200 mg) by mouth once daily. 180 tablet 3    amitriptyline (Elavil) 50 mg tablet TAKE 1 TABLET BY MOUTH ONCE DAILY AT BEDTIME 90 tablet 1    amLODIPine (Norvasc) 10 mg tablet Take 1 tablet (10 mg) by mouth early in the morning..      aspirin 81 mg capsule Take 1 tablet by mouth once daily.      benzonatate (Tessalon) 200 mg capsule Take 1 capsule (200 mg) by mouth 3 times a day as needed for cough. Do not crush or chew. 42 capsule 0    carvedilol (Coreg) 12.5 mg tablet Take 1 tablet by mouth twice daily 180 tablet 1    cyclobenzaprine (Flexeril) 10 mg tablet Take 1 tablet (10 mg) by mouth once daily at bedtime. (Patient taking differently: Take 1 tablet (10 mg) by mouth if needed for muscle spasms.)      ergocalciferol (Vitamin D-2) 1.25 MG (16240 UT) capsule Take 1 capsule (1,250 mcg) by mouth 1 (one) time per week. 13 capsule 3    Farxiga 10 mg Take 1 tablet (10 mg) by mouth once daily. 90 tablet 3    fluticasone (Flonase) 50 mcg/actuation nasal spray USE 2 SPRAY(S) IN EACH NOSTRIL ONCE DAILY *SHAKE  GENTLY.  BEFORE  FIRST  USE,  PRIME  PUMP.  AFTER  USE,  CLEAN  TIP  AND  REPLACE  CAP. (Patient taking differently: Administer 2 sprays into each nostril if needed for allergies.) 16 g 1    FreeStyle Gabbi reader (FreeStyle Gabbi 2 [place]) misc Check sugar 4 x daily 1 each 0    FreeStyle Gabbi sensor system (FreeStyle Gabbi 2 Sensor) kit Check sugar 4 times daily 4 each 0    HYDROcodone-acetaminophen (Norco) 5-325 mg tablet Take 1 tablet by mouth every 6 hours if needed for pain.      insulin aspart (NovoLOG Flexpen U-100 Insulin) 100 unit/mL (3 mL) pen Inject 12 units 2 times daily before meals 18 mL 12    insulin glargine (Lantus Solostar U-100 Insulin) 100 unit/mL (3 mL) pen Inject 50 Units  "under the skin 2 times a day. Take as directed per insulin instructions. 90 mL 1    magnesium oxide 400 mg magnesium capsule Take 1 capsule (400 mg) by mouth once daily.      OneTouch Delica Plus Lancet 33 gauge misc       OneTouch Ultra Control solution       OneTouch Ultra Test strip TO TEST GLUCOSE LEVEL, INSERT 1 NEW STRIP INTO METER THEN APPLY BLOOD ONCE A  strip 1    pantoprazole (ProtoNix) 40 mg EC tablet Take 1 tablet by mouth once daily 90 tablet 1    pen needle, diabetic (BD Ultra-Fine Julee Pen Needle) 32 gauge x 5/32\" needle Inject 100 each under the skin 2 times a day. 100 each 1    pen needle, diabetic 31 gauge x 15/64\" needle USE 2 PEN NEEDLES TWICE DAILY 200 each 1    Pure Comfort Lancets 30 gauge misc USE TO DRAW BLOOD WITH 1 NEW LANCET ONCE A  each 1    simvastatin (Zocor) 20 mg tablet Take 1 tablet by mouth once daily 90 tablet 1    vit A/vit C/vit E/zinc/copper (VITAMINS A,C,E-ZINC-COPPER ORAL) Take 1 capsule by mouth once daily.      losartan (Cozaar) 100 mg tablet Take 1 tablet by mouth once daily (Patient not taking: Reported on 5/7/2025) 90 tablet 1    [DISCONTINUED] pantoprazole (ProtoNix) 40 mg EC tablet Take 1 tablet by mouth once daily 90 tablet 1     No current facility-administered medications on file prior to visit.       Assessment and Plan     Kathya Ruggiero  is a 80 y.o. 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 Side, rt. Lumbar radiculopathy presents for 6 month follow up for CKD.     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 Was initially worsening but recently leveling off  - Her diabetes was not well controlled previously and discussion was done with her to control her diet  - Baseline serum creatinine 1.8-2, GFR 20-30 mils per minute 1.73 mÂ²  - Most recent serum creatinine is at " 1.9, GFR 25 which is her baseline  - Will resume low-dose hydrochlorothiazide 12.5 mg for hypervolemia-monitor kidney function  - Kidney ultrasound done in August 2022 was reviewed and showed increased echogenicity bilateral with no postrenal obstruction. Multiple cysts were identified. In the setting of previosuly worsening kidney functio kidney ultrasound was repeated in December 2022 - no obstruction but with increased echogenicity bilateral  - Continue conservative measures => Encouraged good hydration.   -Continue Farxiga 10 mg every day      # Chronic Anemia  - Iron deficiency anemia/chronic kidney disease - improved status post IV iron infusion in 2023.  - Within normal SPEP, folic acid, vitamin B12 levels  - Iron storage results are normal from 05/24/2024  - Hemoglobin is stable at ~ 12. Most recent H/H at 12.4/ 39.9 from 05/24/2024     # Hypertension  - Reported to be controlled per prior office visits and in this office visit. Takes reads at home  - Current medication include carvedilol 12.5 mg twice daily, amlodipine 10 mg.  Losartan 100 mg was held  - Resume HCTZ 12.5 mg for hypervolemia.  Hold amlodipine 10 mg      # Insulin Dependent Type 2 diabetes -in target/good control  - Last A1c 6.4  - Off metformin due to low GFR  - Previously started on Farxiga 10 mg- to benefit for diabetes control with preserving kidney function.  - Other meds: Novolog flex pen     # Recurrent gout-no recent flares  - Most recent UA is normal  -Continue allopurinol 200 mg every day      # CKD/ MBD  -Within normal calcium, phosphorus is now normal  - Elevated PTH-stable, vitamin D is normal     # CVS- high risk. On simvastatin 20 mg once daily, SGL 2 inhibitors.  Will consider resuming RAAS inhibitors     Follow-up in 6 months with repeat blood work and urinalysis prior to next visit.     Erasmo Hunter MD, MS, JELLY ALVARADO   Clinical  - [employer]   Nephrologist -  [facility]

## 2025-05-07 NOTE — PATIENT INSTRUCTIONS
Dear JUAN   It was nice seeing you in the nephrology clinic today     Today we discussed the following:     #Chronic kidney disease stage 3/4 most likely diabetes and hypertension effect. Your kidney function is at baseline 25-30%.  Will resume 12.5 mg hydrochlorothiazide for leg swelling.  Will stop amlodipine 10 mg.  Continue to hold losartan-will decide on restarting next visit      #Uric acid is elevated-now normal. Continue allopurinol     #Hypertension-blood pressure is accepted at home-stopping amlodipine.  Starting half a pill of hydrochlorothiazide    #Diabetes-continue to hold metformin. Continue Farxiga 10 mg- this class of medication might increase risk of urinary tract infection. Keep the genital area clean and dry. Don't hold urine (counseled)     #Anemia with low iron-improved significantly after IV iron infusion     #Low vitamin D-now normal. Continue vitamin D supplements weekly     Let me know if swelling does not improve in a week or so  Follow-up in 6 months with repeat blood work and urinalysis prior to next visit.     Please call our office if you have any question  Thank you for coming to see me today     Erasmo Hunter MD, MS, JELLY ALVARADO  Clinical  - [employer]  Nephrologist - [facility]

## 2025-05-09 RX ORDER — HYDROCHLOROTHIAZIDE 12.5 MG/1
12.5 TABLET ORAL DAILY
Qty: 90 TABLET | Refills: 3 | Status: SHIPPED | OUTPATIENT
Start: 2025-05-09 | End: 2026-05-09

## 2025-05-29 DIAGNOSIS — I10 HYPERTENSION, UNSPECIFIED TYPE: ICD-10-CM

## 2025-05-30 RX ORDER — CARVEDILOL 12.5 MG/1
12.5 TABLET ORAL 2 TIMES DAILY
Qty: 180 TABLET | Refills: 1 | Status: SHIPPED | OUTPATIENT
Start: 2025-05-30

## 2025-06-12 ENCOUNTER — APPOINTMENT (OUTPATIENT)
Dept: PRIMARY CARE | Facility: CLINIC | Age: 80
End: 2025-06-12
Payer: MEDICARE

## 2025-06-12 VITALS
SYSTOLIC BLOOD PRESSURE: 117 MMHG | DIASTOLIC BLOOD PRESSURE: 72 MMHG | WEIGHT: 195 LBS | BODY MASS INDEX: 36.82 KG/M2 | HEIGHT: 61 IN | OXYGEN SATURATION: 92 % | HEART RATE: 65 BPM

## 2025-06-12 DIAGNOSIS — N18.4 TYPE 2 DIABETES MELLITUS WITH STAGE 4 CHRONIC KIDNEY DISEASE, WITH LONG-TERM CURRENT USE OF INSULIN (MULTI): ICD-10-CM

## 2025-06-12 DIAGNOSIS — E78.5 HYPERLIPIDEMIA, UNSPECIFIED HYPERLIPIDEMIA TYPE: ICD-10-CM

## 2025-06-12 DIAGNOSIS — E11.22 TYPE 2 DIABETES MELLITUS WITH STAGE 4 CHRONIC KIDNEY DISEASE, WITH LONG-TERM CURRENT USE OF INSULIN (MULTI): ICD-10-CM

## 2025-06-12 DIAGNOSIS — Z79.4 TYPE 2 DIABETES MELLITUS WITH STAGE 4 CHRONIC KIDNEY DISEASE, WITH LONG-TERM CURRENT USE OF INSULIN (MULTI): ICD-10-CM

## 2025-06-12 DIAGNOSIS — Z00.00 ROUTINE GENERAL MEDICAL EXAMINATION AT HEALTH CARE FACILITY: Primary | ICD-10-CM

## 2025-06-12 LAB — POC HEMOGLOBIN A1C: 6.4 % (ref 4.2–6.5)

## 2025-06-12 RX ORDER — ALLOPURINOL 100 MG/1
2 TABLET ORAL
COMMUNITY
Start: 2025-03-23

## 2025-06-12 ASSESSMENT — ACTIVITIES OF DAILY LIVING (ADL)
TAKING_MEDICATION: INDEPENDENT
MANAGING_FINANCES: INDEPENDENT
DOING_HOUSEWORK: INDEPENDENT
DRESSING: INDEPENDENT
GROCERY_SHOPPING: INDEPENDENT
BATHING: INDEPENDENT

## 2025-06-12 ASSESSMENT — PATIENT HEALTH QUESTIONNAIRE - PHQ9
2. FEELING DOWN, DEPRESSED OR HOPELESS: NOT AT ALL
SUM OF ALL RESPONSES TO PHQ9 QUESTIONS 1 AND 2: 0
1. LITTLE INTEREST OR PLEASURE IN DOING THINGS: NOT AT ALL

## 2025-06-12 ASSESSMENT — ENCOUNTER SYMPTOMS
DEPRESSION: 0
OCCASIONAL FEELINGS OF UNSTEADINESS: 1
LOSS OF SENSATION IN FEET: 0

## 2025-06-12 ASSESSMENT — PAIN SCALES - GENERAL: PAINLEVEL_OUTOF10: 0-NO PAIN

## 2025-06-12 NOTE — PATIENT INSTRUCTIONS
Please complete fasting blood work. Fast for 10 hours, black coffee and water the morning of labs are OK.       RTC 6 months

## 2025-06-12 NOTE — PROGRESS NOTES
"Subjective   Patient ID: Kathya Ruggiero is a 80 y.o. female who presents for Follow-up and Medicare Annual Wellness Visit Subsequent.    HPI     States past few weeks morning sugars have been high. Requesting new CGM because insurance informed her that FreeStyle will no longer be covered.     Per FreeStyle daina, 9% very high, 37% high, and 54% good.     No activity or medication changes. Reports blood sugar tends to be high at night, attributed to snacking before bed. Denies episodes of dizziness, lightheadedness, and hypoglycemia.     Checks BP occasionally, estimated a few times a week. Last was 121/76    Review of Systems   All other systems reviewed and are negative.      Objective   /72   Pulse 65   Ht 1.549 m (5' 1\")   Wt 88.5 kg (195 lb)   SpO2 92%   BMI 36.84 kg/m²     Physical Exam  Vitals reviewed.   Cardiovascular:      Rate and Rhythm: Normal rate and regular rhythm.      Heart sounds: Normal heart sounds. No murmur heard.     No gallop.   Pulmonary:      Effort: Pulmonary effort is normal. No respiratory distress.      Breath sounds: Normal breath sounds.   Abdominal:      General: Abdomen is flat. There is no distension.      Palpations: Abdomen is soft.      Tenderness: There is no abdominal tenderness.   Musculoskeletal:      Right lower leg: No edema.      Left lower leg: No edema.   Skin:     General: Skin is warm and dry.   Neurological:      Mental Status: She is alert. Mental status is at baseline.   Psychiatric:         Mood and Affect: Mood normal.         Behavior: Behavior normal.         Assessment/Plan   #DM2  -POCT A1c 6.4% (6/2025), stable  -Cont Farxiga 10mg daily  -Cont Lantus 50 units BID and Novolog 12U AC   -Patient opting for finger sticks due to cost/insurance coverage.     #HTN  -BP is good at home.   -Stopped losartan 100mg daily for worsening kidney function  -Cont carvedilol 12.5mg BID and hydrochlorothiazide 12.5 mg      #HLD  -Cont simvastatin 20mg  -Ordered lipid " panel     #Insomia  -Continue amitriptyline 50mg nightly      #CKD4  -Following with Dr. Hunter.      #Gout   -Cont allopurinol 100mg    #Right knee pain  -follows with orthopedics - [facility]    #Vit D deficiency  - Cont Vit D supplements     Influenza: UTD  COVID: x3   Prevnar 13: UTD   Pneumovax 23: UTD   Prevnar 20: 4/2024  RSV: x1  Shingrix: x2  Mamm: NI - aged out  DEXA: 6/10/2020--normal repeat 10 yrs  Pap: NI   Colorectal ca: 9/12/13  Lung ca screening: NI      RTC 6 months    Mayda Gonzalez DO  Internal Medicine PGY-1

## 2025-06-23 ENCOUNTER — APPOINTMENT (OUTPATIENT)
Dept: NEPHROLOGY | Facility: CLINIC | Age: 80
End: 2025-06-23
Payer: MEDICARE

## 2025-06-28 DIAGNOSIS — E78.5 HYPERLIPIDEMIA, UNSPECIFIED HYPERLIPIDEMIA TYPE: ICD-10-CM

## 2025-06-30 RX ORDER — SIMVASTATIN 20 MG/1
20 TABLET, FILM COATED ORAL DAILY
Qty: 90 TABLET | Refills: 1 | Status: SHIPPED | OUTPATIENT
Start: 2025-06-30

## 2025-07-18 DIAGNOSIS — M10.00 ACUTE IDIOPATHIC GOUT, UNSPECIFIED SITE: Primary | ICD-10-CM

## 2025-07-24 RX ORDER — ALLOPURINOL 100 MG/1
200 TABLET ORAL DAILY
Qty: 180 TABLET | Refills: 3 | Status: SHIPPED | OUTPATIENT
Start: 2025-07-24 | End: 2026-07-24

## 2025-07-27 DIAGNOSIS — G47.00 INSOMNIA, UNSPECIFIED TYPE: ICD-10-CM

## 2025-07-28 RX ORDER — AMITRIPTYLINE HYDROCHLORIDE 50 MG/1
50 TABLET, FILM COATED ORAL NIGHTLY
Qty: 90 TABLET | Refills: 1 | Status: SHIPPED | OUTPATIENT
Start: 2025-07-28

## 2025-08-17 DIAGNOSIS — N18.32 STAGE 3B CHRONIC KIDNEY DISEASE (MULTI): ICD-10-CM

## 2025-08-18 DIAGNOSIS — E11.9 DIABETES MELLITUS WITHOUT COMPLICATION: ICD-10-CM

## 2025-08-18 RX ORDER — BLOOD-GLUCOSE SENSOR
EACH MISCELLANEOUS
Qty: 5 EACH | Refills: 3 | Status: SHIPPED | OUTPATIENT
Start: 2025-08-18

## 2025-08-18 RX ORDER — BLOOD-GLUCOSE,RECEIVER,CONT
EACH MISCELLANEOUS
Qty: 1 EACH | Refills: 0 | Status: SHIPPED | OUTPATIENT
Start: 2025-08-18

## 2025-08-19 RX ORDER — DAPAGLIFLOZIN 10 MG/1
10 TABLET, FILM COATED ORAL DAILY
Qty: 90 TABLET | Refills: 0 | Status: SHIPPED | OUTPATIENT
Start: 2025-08-19

## 2025-11-20 ENCOUNTER — APPOINTMENT (OUTPATIENT)
Dept: PRIMARY CARE | Facility: CLINIC | Age: 80
End: 2025-11-20
Payer: MEDICARE

## 2025-11-25 ENCOUNTER — APPOINTMENT (OUTPATIENT)
Dept: NEPHROLOGY | Facility: CLINIC | Age: 80
End: 2025-11-25
Payer: MEDICARE

## 2026-01-28 ENCOUNTER — APPOINTMENT (OUTPATIENT)
Dept: DERMATOLOGY | Facility: CLINIC | Age: 81
End: 2026-01-28
Payer: MEDICARE